# Patient Record
Sex: FEMALE | Race: ASIAN | NOT HISPANIC OR LATINO | URBAN - METROPOLITAN AREA
[De-identification: names, ages, dates, MRNs, and addresses within clinical notes are randomized per-mention and may not be internally consistent; named-entity substitution may affect disease eponyms.]

---

## 2024-09-23 PROBLEM — R91.8 LUNG NODULE, MULTIPLE: Status: ACTIVE | Noted: 2024-09-23

## 2024-09-25 ENCOUNTER — OUTPATIENT (OUTPATIENT)
Dept: OUTPATIENT SERVICES | Facility: HOSPITAL | Age: 64
LOS: 1 days | End: 2024-09-25

## 2024-09-27 ENCOUNTER — OUTPATIENT (OUTPATIENT)
Dept: OUTPATIENT SERVICES | Facility: HOSPITAL | Age: 64
LOS: 1 days | End: 2024-09-27
Payer: MEDICARE

## 2024-09-27 DIAGNOSIS — R91.8 OTHER NONSPECIFIC ABNORMAL FINDING OF LUNG FIELD: ICD-10-CM

## 2024-09-27 PROBLEM — Z86.39 HISTORY OF HYPERLIPIDEMIA: Status: RESOLVED | Noted: 2024-09-27 | Resolved: 2024-09-27

## 2024-09-27 PROBLEM — Z86.39 HISTORY OF GOITER: Status: RESOLVED | Noted: 2024-09-27 | Resolved: 2024-09-27

## 2024-09-27 LAB
ALBUMIN SERPL ELPH-MCNC: 4.3 G/DL — SIGNIFICANT CHANGE UP (ref 3.3–5)
ALP SERPL-CCNC: 91 U/L — SIGNIFICANT CHANGE UP (ref 40–120)
ALT FLD-CCNC: 13 U/L — SIGNIFICANT CHANGE UP (ref 10–45)
ANION GAP SERPL CALC-SCNC: 10 MMOL/L — SIGNIFICANT CHANGE UP (ref 5–17)
APPEARANCE UR: ABNORMAL
APTT BLD: 36.5 SEC — HIGH (ref 24.5–35.6)
AST SERPL-CCNC: 19 U/L — SIGNIFICANT CHANGE UP (ref 10–40)
BACTERIA # UR AUTO: NEGATIVE /HPF — SIGNIFICANT CHANGE UP
BASOPHILS # BLD AUTO: 0.03 K/UL — SIGNIFICANT CHANGE UP (ref 0–0.2)
BASOPHILS NFR BLD AUTO: 0.5 % — SIGNIFICANT CHANGE UP (ref 0–2)
BILIRUB SERPL-MCNC: 0.6 MG/DL — SIGNIFICANT CHANGE UP (ref 0.2–1.2)
BILIRUB UR-MCNC: NEGATIVE — SIGNIFICANT CHANGE UP
BUN SERPL-MCNC: 18 MG/DL — SIGNIFICANT CHANGE UP (ref 7–23)
CALCIUM SERPL-MCNC: 8.6 MG/DL — SIGNIFICANT CHANGE UP (ref 8.4–10.5)
CHLORIDE SERPL-SCNC: 104 MMOL/L — SIGNIFICANT CHANGE UP (ref 96–108)
CO2 SERPL-SCNC: 28 MMOL/L — SIGNIFICANT CHANGE UP (ref 22–31)
COLOR SPEC: YELLOW — SIGNIFICANT CHANGE UP
CREAT SERPL-MCNC: 0.75 MG/DL — SIGNIFICANT CHANGE UP (ref 0.5–1.3)
DIFF PNL FLD: NEGATIVE — SIGNIFICANT CHANGE UP
EGFR: 88 ML/MIN/1.73M2 — SIGNIFICANT CHANGE UP
EOSINOPHIL # BLD AUTO: 0.06 K/UL — SIGNIFICANT CHANGE UP (ref 0–0.5)
EOSINOPHIL NFR BLD AUTO: 1.1 % — SIGNIFICANT CHANGE UP (ref 0–6)
GLUCOSE SERPL-MCNC: 91 MG/DL — SIGNIFICANT CHANGE UP (ref 70–99)
GLUCOSE UR QL: NEGATIVE MG/DL — SIGNIFICANT CHANGE UP
HCT VFR BLD CALC: 41.6 % — SIGNIFICANT CHANGE UP (ref 34.5–45)
HGB BLD-MCNC: 13.3 G/DL — SIGNIFICANT CHANGE UP (ref 11.5–15.5)
IMM GRANULOCYTES NFR BLD AUTO: 0.2 % — SIGNIFICANT CHANGE UP (ref 0–0.9)
INR BLD: 1.01 — SIGNIFICANT CHANGE UP (ref 0.85–1.16)
KETONES UR-MCNC: NEGATIVE MG/DL — SIGNIFICANT CHANGE UP
LEUKOCYTE ESTERASE UR-ACNC: ABNORMAL
LYMPHOCYTES # BLD AUTO: 1.88 K/UL — SIGNIFICANT CHANGE UP (ref 1–3.3)
LYMPHOCYTES # BLD AUTO: 33.9 % — SIGNIFICANT CHANGE UP (ref 13–44)
MCHC RBC-ENTMCNC: 29.1 PG — SIGNIFICANT CHANGE UP (ref 27–34)
MCHC RBC-ENTMCNC: 32 GM/DL — SIGNIFICANT CHANGE UP (ref 32–36)
MCV RBC AUTO: 91 FL — SIGNIFICANT CHANGE UP (ref 80–100)
MONOCYTES # BLD AUTO: 0.27 K/UL — SIGNIFICANT CHANGE UP (ref 0–0.9)
MONOCYTES NFR BLD AUTO: 4.9 % — SIGNIFICANT CHANGE UP (ref 2–14)
NEUTROPHILS # BLD AUTO: 3.29 K/UL — SIGNIFICANT CHANGE UP (ref 1.8–7.4)
NEUTROPHILS NFR BLD AUTO: 59.4 % — SIGNIFICANT CHANGE UP (ref 43–77)
NITRITE UR-MCNC: NEGATIVE — SIGNIFICANT CHANGE UP
NRBC # BLD: 0 /100 WBCS — SIGNIFICANT CHANGE UP (ref 0–0)
PH UR: 6 — SIGNIFICANT CHANGE UP (ref 5–8)
PLATELET # BLD AUTO: 188 K/UL — SIGNIFICANT CHANGE UP (ref 150–400)
POTASSIUM SERPL-MCNC: 4.6 MMOL/L — SIGNIFICANT CHANGE UP (ref 3.5–5.3)
POTASSIUM SERPL-SCNC: 4.6 MMOL/L — SIGNIFICANT CHANGE UP (ref 3.5–5.3)
PROT SERPL-MCNC: 7 G/DL — SIGNIFICANT CHANGE UP (ref 6–8.3)
PROT UR-MCNC: NEGATIVE MG/DL — SIGNIFICANT CHANGE UP
PROTHROM AB SERPL-ACNC: 11.6 SEC — SIGNIFICANT CHANGE UP (ref 9.9–13.4)
RBC # BLD: 4.57 M/UL — SIGNIFICANT CHANGE UP (ref 3.8–5.2)
RBC # FLD: 11.9 % — SIGNIFICANT CHANGE UP (ref 10.3–14.5)
RBC CASTS # UR COMP ASSIST: 1 /HPF — SIGNIFICANT CHANGE UP (ref 0–4)
SODIUM SERPL-SCNC: 142 MMOL/L — SIGNIFICANT CHANGE UP (ref 135–145)
SP GR SPEC: 1.01 — SIGNIFICANT CHANGE UP (ref 1–1.03)
SQUAMOUS # UR AUTO: 0 /HPF — SIGNIFICANT CHANGE UP (ref 0–5)
UROBILINOGEN FLD QL: 0.2 MG/DL — SIGNIFICANT CHANGE UP (ref 0.2–1)
WBC # BLD: 5.54 K/UL — SIGNIFICANT CHANGE UP (ref 3.8–10.5)
WBC # FLD AUTO: 5.54 K/UL — SIGNIFICANT CHANGE UP (ref 3.8–10.5)
WBC UR QL: 3 /HPF — SIGNIFICANT CHANGE UP (ref 0–5)

## 2024-09-27 PROCEDURE — 94729 DIFFUSING CAPACITY: CPT | Mod: 26

## 2024-09-27 PROCEDURE — 94060 EVALUATION OF WHEEZING: CPT

## 2024-09-27 PROCEDURE — 94760 N-INVAS EAR/PLS OXIMETRY 1: CPT

## 2024-09-27 PROCEDURE — 94726 PLETHYSMOGRAPHY LUNG VOLUMES: CPT | Mod: 26

## 2024-09-27 PROCEDURE — 81001 URINALYSIS AUTO W/SCOPE: CPT

## 2024-09-27 PROCEDURE — 94726 PLETHYSMOGRAPHY LUNG VOLUMES: CPT

## 2024-09-27 PROCEDURE — 85730 THROMBOPLASTIN TIME PARTIAL: CPT

## 2024-09-27 PROCEDURE — 86900 BLOOD TYPING SEROLOGIC ABO: CPT

## 2024-09-27 PROCEDURE — 94010 BREATHING CAPACITY TEST: CPT | Mod: 26

## 2024-09-27 PROCEDURE — 85025 COMPLETE CBC W/AUTO DIFF WBC: CPT

## 2024-09-27 PROCEDURE — 94729 DIFFUSING CAPACITY: CPT

## 2024-09-27 PROCEDURE — 86901 BLOOD TYPING SEROLOGIC RH(D): CPT

## 2024-09-27 PROCEDURE — 80053 COMPREHEN METABOLIC PANEL: CPT

## 2024-09-27 PROCEDURE — 86850 RBC ANTIBODY SCREEN: CPT

## 2024-09-27 PROCEDURE — 85610 PROTHROMBIN TIME: CPT

## 2024-09-30 PROBLEM — Z00.00 ENCOUNTER FOR PREVENTIVE HEALTH EXAMINATION: Status: ACTIVE | Noted: 2024-09-30

## 2024-10-01 NOTE — H&P ADULT - NSHPREVIEWOFSYSTEMS_GEN_ALL_CORE
Constitutional: as noted in HPI and recent weight loss.    Constitutional, Eyes, ENT, Cardiovascular, Respiratory, Gastrointestinal, Genitourinary, Musculoskeletal, Integumentary, Neurological, Psychiatric, Endocrine and Heme/Lymph are otherwise negative.

## 2024-10-01 NOTE — H&P ADULT - HISTORY OF PRESENT ILLNESS
Ms. MARI JUAREZ, 63 year old female, never smoker, w/ hx of HLD, goiter s/p thyroidectomy, who presented with incidental finding of lung nodule, referred by Dr. Ryne Stover (PCP). Patient is here today for CT Sx consultation. Pt admits to weight loss, denies fever, chill, SOB, cough, hemoptysis, or CP.

## 2024-10-01 NOTE — H&P ADULT - NSHPLABSRESULTS_GEN_ALL_CORE
CT Chest on 9/19/24 at [facility]:  - Solid, spiculated, irregular peripheral right lower lobe pulmonary nodule measuring 2.9 x 2.0 cm (with extension to the pleural surface) on image 189, series 3 highly suspicious for primary bronchogenic neoplasm until proven otherwise.  - Mild focal bronchiectasis and tree-in-bud nodularity posterior/inferior right lower lobe on images 115-143, series 3 suggesting small airways inflammatory disease/bronchiolitis.  - Additional tree-in-bud nodularity anterior right lower lobe on images 180-196, series 3. Small 5 mm pulmonary nodule on image 21, series 3 may reflect bronchial mucoid impaction versus pulmonary nodule.  ?  Brain MRI on 9/19/24 at [facility]:  - Unremarkable non-contrast MRI of the brain.  ?  PET/CT on 9/24/24:  - an FDG avid lobulated nodule in the peripheral RLL 2.6 cm SUV=4.9  - additional nonavid 5 mm nodule in RLL  - stable nonavid focal areas of tree-in-bud opacity in the LLL

## 2024-10-01 NOTE — H&P ADULT - NSICDXPASTSURGICALHX_GEN_ALL_CORE_FT
PAST SURGICAL HISTORY:  H/O thyroidectomy     History of open reduction and internal fixation (ORIF) procedure

## 2024-10-01 NOTE — H&P ADULT - NSHPPHYSICALEXAM_GEN_ALL_CORE
weight = 134 lbs    ECOG Performance Status: Status 1- Restricted in physically strenuous activity but ambulatory and able to carry out work of a light or sedentary nature, e.g., light house work, office work.       Constitutional: alert and in no acute distress.    Eyes: the sclera and conjunctiva were normal, pupils were equal in size, round, and reactive to light and extraocular movements were intact.    ENT: the ears and nose were normal in appearance . the oropharynx was normal.    Neck: the appearance of the neck was normal, no neck mass was observed, the thyroid was not enlarged and there were no palpable thyroid nodules . there was no jugular-venous distention.    Pulmonary: no respiratory distress and lungs were clear to auscultation bilaterally.    Heart: heart rate was normal and rhythm regular, normal S1 and S2, no gallops, no murmurs and no pericardial rub.    Chest: the chest was normal in appearance, no chest asymmetry and normal chest expansion.    Vascular: Carotid: right 2+ and left 2+. Brachial: right 2+ and left 2+. Radial: right 2+ and left 2+.    Breasts: normal in appearance and no palpable masses.    Abdomen: normal bowel sounds, soft, non-tender, no hepato-splenomegaly and no abdominal mass palpated.    Genitourinary:. deferred.    Lymphatics: The posterior cervical, anterior cervical and supraclavicular nodes were non-tender and normal size.    Back: no costovertebral angle tenderness and no spinal tenderness.    Musculoskeletal: normal gait, no clubbing or cyanosis of the fingernails, no joint swelling seen and muscle strength and tone were normal.    Skin: normal skin color and pigmentation, normal skin turgor and no rash.    Neurological: deep tendon reflexes were 2+ and symmetric, the sensory exam was normal to light touch and pinprick and no focal deficits.    Psychiatric: oriented to person, place, and time, insight and judgment were intact and the affect was normal.

## 2024-10-01 NOTE — H&P ADULT - ASSESSMENT
Ms. MARI JUAREZ, 63 year old female, never smoker, w/ hx of HLD, goiter s/p thyroidectomy, who presented with incidental finding of lung nodule, referred by Dr. Ryne Stover (PCP)  ?  CT Chest on 9/19/24 at [facility]:  - Solid, spiculated, irregular peripheral right lower lobe pulmonary nodule measuring 2.9 x 2.0 cm (with extension to the pleural surface) on image 189, series 3 highly suspicious for primary bronchogenic neoplasm until proven otherwise.  - Mild focal bronchiectasis and tree-in-bud nodularity posterior/inferior right lower lobe on images 115-143, series 3 suggesting small airways inflammatory disease/bronchiolitis.  - Additional tree-in-bud nodularity anterior right lower lobe on images 180-196, series 3. Small 5 mm pulmonary nodule on image 21, series 3 may reflect bronchial mucoid impaction versus pulmonary nodule.  ?  Brain MRI on 9/19/24 at [facility] [place]:  - Unremarkable non-contrast MRI of the brain.  ?  PET/CT on 9/24/24:  - an FDG avid lobulated nodule in the peripheral RLL 2.6 cm SUV=4.9  - additional nonavid 5 mm nodule in RLL  - stable nonavid focal areas of tree-in-bud opacity in the LLL  ?  I have reviewed the patient's medical records and diagnostic images at time of this office consultation and have made the following recommendation:  1. PET reviewed with pt, RLL nodule is highly suspicious for malignancy. Therefore, I recommended Rt VATS, R.A., RLLobectomy, MLND on 10/4/24 at [facility]. Risks of surgery includes but not limited to pain, infection, bleeding, injuries to surrounding structures, and need for further procedure, stroke or death. Benefits and alternatives explained to patient, all questions answered, patient agreed to proceed with surgery.

## 2024-10-03 NOTE — PATIENT PROFILE ADULT - FALL HARM RISK - UNIVERSAL INTERVENTIONS
Bed in lowest position, wheels locked, appropriate side rails in place/Call bell, personal items and telephone in reach/Instruct patient to call for assistance before getting out of bed or chair/Non-slip footwear when patient is out of bed/[place] to call system/Physically safe environment - no spills, clutter or unnecessary equipment/Purposeful Proactive Rounding/Room/bathroom lighting operational, light cord in reach

## 2024-10-03 NOTE — PATIENT PROFILE ADULT - STATED REASON FOR ADMISSION
Right video assisted thoracic surgery, Robotic assisted right lower lobectomy and mediastinal lymph node dissection

## 2024-10-04 ENCOUNTER — INPATIENT (INPATIENT)
Facility: HOSPITAL | Age: 64
LOS: 2 days | Discharge: HOME | End: 2024-10-07
Attending: THORACIC SURGERY (CARDIOTHORACIC VASCULAR SURGERY) | Admitting: THORACIC SURGERY (CARDIOTHORACIC VASCULAR SURGERY)
Payer: MEDICARE

## 2024-10-04 VITALS
RESPIRATION RATE: 16 BRPM | HEART RATE: 63 BPM | HEIGHT: 64 IN | SYSTOLIC BLOOD PRESSURE: 137 MMHG | WEIGHT: 135.14 LBS | TEMPERATURE: 98 F | OXYGEN SATURATION: 98 % | DIASTOLIC BLOOD PRESSURE: 77 MMHG

## 2024-10-04 DIAGNOSIS — Z98.890 OTHER SPECIFIED POSTPROCEDURAL STATES: Chronic | ICD-10-CM

## 2024-10-04 DIAGNOSIS — J98.4 OTHER DISORDERS OF LUNG: ICD-10-CM

## 2024-10-04 DIAGNOSIS — R91.1 SOLITARY PULMONARY NODULE: ICD-10-CM

## 2024-10-04 DIAGNOSIS — Z79.890 HORMONE REPLACEMENT THERAPY: ICD-10-CM

## 2024-10-04 DIAGNOSIS — J95.812 POSTPROCEDURAL AIR LEAK: ICD-10-CM

## 2024-10-04 DIAGNOSIS — E89.0 POSTPROCEDURAL HYPOTHYROIDISM: ICD-10-CM

## 2024-10-04 DIAGNOSIS — E89.0 POSTPROCEDURAL HYPOTHYROIDISM: Chronic | ICD-10-CM

## 2024-10-04 LAB
ANION GAP SERPL CALC-SCNC: 12 MMOL/L — SIGNIFICANT CHANGE UP (ref 5–17)
APTT BLD: 32.2 SEC — SIGNIFICANT CHANGE UP (ref 24.5–35.6)
BASOPHILS # BLD AUTO: 0.02 K/UL — SIGNIFICANT CHANGE UP (ref 0–0.2)
BASOPHILS NFR BLD AUTO: 0.2 % — SIGNIFICANT CHANGE UP (ref 0–2)
BLD GP AB SCN SERPL QL: NEGATIVE — SIGNIFICANT CHANGE UP
BUN SERPL-MCNC: 16 MG/DL — SIGNIFICANT CHANGE UP (ref 7–23)
CALCIUM SERPL-MCNC: 8.3 MG/DL — LOW (ref 8.4–10.5)
CHLORIDE SERPL-SCNC: 104 MMOL/L — SIGNIFICANT CHANGE UP (ref 96–108)
CO2 SERPL-SCNC: 24 MMOL/L — SIGNIFICANT CHANGE UP (ref 22–31)
CREAT SERPL-MCNC: 0.68 MG/DL — SIGNIFICANT CHANGE UP (ref 0.5–1.3)
EGFR: 96 ML/MIN/1.73M2 — SIGNIFICANT CHANGE UP
EOSINOPHIL # BLD AUTO: 0.01 K/UL — SIGNIFICANT CHANGE UP (ref 0–0.5)
EOSINOPHIL NFR BLD AUTO: 0.1 % — SIGNIFICANT CHANGE UP (ref 0–6)
GLUCOSE BLDC GLUCOMTR-MCNC: 161 MG/DL — HIGH (ref 70–99)
GLUCOSE SERPL-MCNC: 160 MG/DL — HIGH (ref 70–99)
HCT VFR BLD CALC: 38.6 % — SIGNIFICANT CHANGE UP (ref 34.5–45)
HGB BLD-MCNC: 12.6 G/DL — SIGNIFICANT CHANGE UP (ref 11.5–15.5)
IMM GRANULOCYTES NFR BLD AUTO: 0.5 % — SIGNIFICANT CHANGE UP (ref 0–0.9)
INR BLD: 0.99 — SIGNIFICANT CHANGE UP (ref 0.85–1.16)
LYMPHOCYTES # BLD AUTO: 1.16 K/UL — SIGNIFICANT CHANGE UP (ref 1–3.3)
LYMPHOCYTES # BLD AUTO: 10.5 % — LOW (ref 13–44)
MCHC RBC-ENTMCNC: 29.2 PG — SIGNIFICANT CHANGE UP (ref 27–34)
MCHC RBC-ENTMCNC: 32.6 GM/DL — SIGNIFICANT CHANGE UP (ref 32–36)
MCV RBC AUTO: 89.4 FL — SIGNIFICANT CHANGE UP (ref 80–100)
MONOCYTES # BLD AUTO: 0.24 K/UL — SIGNIFICANT CHANGE UP (ref 0–0.9)
MONOCYTES NFR BLD AUTO: 2.2 % — SIGNIFICANT CHANGE UP (ref 2–14)
NEUTROPHILS # BLD AUTO: 9.54 K/UL — HIGH (ref 1.8–7.4)
NEUTROPHILS NFR BLD AUTO: 86.5 % — HIGH (ref 43–77)
NRBC # BLD: 0 /100 WBCS — SIGNIFICANT CHANGE UP (ref 0–0)
PLATELET # BLD AUTO: 155 K/UL — SIGNIFICANT CHANGE UP (ref 150–400)
POTASSIUM SERPL-MCNC: 3.6 MMOL/L — SIGNIFICANT CHANGE UP (ref 3.5–5.3)
POTASSIUM SERPL-SCNC: 3.6 MMOL/L — SIGNIFICANT CHANGE UP (ref 3.5–5.3)
PROTHROM AB SERPL-ACNC: 11.6 SEC — SIGNIFICANT CHANGE UP (ref 9.9–13.4)
RBC # BLD: 4.32 M/UL — SIGNIFICANT CHANGE UP (ref 3.8–5.2)
RBC # FLD: 11.8 % — SIGNIFICANT CHANGE UP (ref 10.3–14.5)
RH IG SCN BLD-IMP: POSITIVE — SIGNIFICANT CHANGE UP
SODIUM SERPL-SCNC: 140 MMOL/L — SIGNIFICANT CHANGE UP (ref 135–145)
WBC # BLD: 11.03 K/UL — HIGH (ref 3.8–10.5)
WBC # FLD AUTO: 11.03 K/UL — HIGH (ref 3.8–10.5)

## 2024-10-04 PROCEDURE — 88309 TISSUE EXAM BY PATHOLOGIST: CPT | Mod: 26

## 2024-10-04 PROCEDURE — 32674 THORACOSCOPY LYMPH NODE EXC: CPT | Mod: 80

## 2024-10-04 PROCEDURE — 32663 THORACOSCOPY W/LOBECTOMY: CPT | Mod: RT

## 2024-10-04 PROCEDURE — 32663 THORACOSCOPY W/LOBECTOMY: CPT | Mod: 80,RT

## 2024-10-04 PROCEDURE — 32652 THORACOSCOPY REM TOTL CORTEX: CPT | Mod: RT

## 2024-10-04 PROCEDURE — 88305 TISSUE EXAM BY PATHOLOGIST: CPT | Mod: 26

## 2024-10-04 PROCEDURE — 71045 X-RAY EXAM CHEST 1 VIEW: CPT | Mod: 26

## 2024-10-04 PROCEDURE — 32652 THORACOSCOPY REM TOTL CORTEX: CPT | Mod: 80,RT

## 2024-10-04 PROCEDURE — 32674 THORACOSCOPY LYMPH NODE EXC: CPT

## 2024-10-04 PROCEDURE — S2900 ROBOTIC SURGICAL SYSTEM: CPT | Mod: NC

## 2024-10-04 DEVICE — LIGATING CLIPS WECK HEMOLOK POLYMER LARGE (PURPLE) 6: Type: IMPLANTABLE DEVICE | Site: RIGHT | Status: FUNCTIONAL

## 2024-10-04 DEVICE — CHEST DRAIN THORACIC PVC 28FR STRAIGHT: Type: IMPLANTABLE DEVICE | Site: RIGHT | Status: FUNCTIONAL

## 2024-10-04 DEVICE — STAPLER COVIDIEN TRI-STAPLE CURVED 45MM TAN RELOAD: Type: IMPLANTABLE DEVICE | Site: RIGHT | Status: FUNCTIONAL

## 2024-10-04 DEVICE — STAPLER COVIDIEN TRI-STAPLE CURVED 60MM PURPLE RELOAD: Type: IMPLANTABLE DEVICE | Site: RIGHT | Status: FUNCTIONAL

## 2024-10-04 DEVICE — STAPLER COVIDIEN TRI-STAPLE 60MM PURPLE RELOAD: Type: IMPLANTABLE DEVICE | Site: RIGHT | Status: FUNCTIONAL

## 2024-10-04 DEVICE — STAPLER COVIDIEN TRI-STAPLE 45MM PURPLE RELOAD: Type: IMPLANTABLE DEVICE | Site: RIGHT | Status: FUNCTIONAL

## 2024-10-04 DEVICE — STAPLER COVIDIEN TRI-STAPLE 60MM TAN RELOAD: Type: IMPLANTABLE DEVICE | Site: RIGHT | Status: FUNCTIONAL

## 2024-10-04 DEVICE — STAPLER COVIDIEN TRI-STAPLE CURVED 45MM PURPLE RELOAD: Type: IMPLANTABLE DEVICE | Site: RIGHT | Status: FUNCTIONAL

## 2024-10-04 RX ORDER — ACETAMINOPHEN 325 MG
975 TABLET ORAL ONCE
Refills: 0 | Status: COMPLETED | OUTPATIENT
Start: 2024-10-04 | End: 2024-10-04

## 2024-10-04 RX ORDER — TIOTROPIUM BROMIDE INHALATION SPRAY 3.12 UG/1
2 SPRAY, METERED RESPIRATORY (INHALATION) DAILY
Refills: 0 | Status: DISCONTINUED | OUTPATIENT
Start: 2024-10-04 | End: 2024-10-04

## 2024-10-04 RX ORDER — SODIUM CHLORIDE IRRIG SOLUTION 0.9 %
1000 SOLUTION, IRRIGATION IRRIGATION
Refills: 0 | Status: DISCONTINUED | OUTPATIENT
Start: 2024-10-04 | End: 2024-10-04

## 2024-10-04 RX ORDER — METOPROLOL TARTRATE 50 MG
12.5 TABLET ORAL EVERY 12 HOURS
Refills: 0 | Status: DISCONTINUED | OUTPATIENT
Start: 2024-10-05 | End: 2024-10-07

## 2024-10-04 RX ORDER — FAMOTIDINE 40 MG
20 TABLET ORAL DAILY
Refills: 0 | Status: DISCONTINUED | OUTPATIENT
Start: 2024-10-04 | End: 2024-10-04

## 2024-10-04 RX ORDER — LIDOCAINE 50 MG/G
1 CREAM TOPICAL DAILY
Refills: 0 | Status: DISCONTINUED | OUTPATIENT
Start: 2024-10-04 | End: 2024-10-07

## 2024-10-04 RX ORDER — ACETAMINOPHEN 325 MG
650 TABLET ORAL EVERY 6 HOURS
Refills: 0 | Status: DISCONTINUED | OUTPATIENT
Start: 2024-10-05 | End: 2024-10-05

## 2024-10-04 RX ORDER — OXYCODONE HYDROCHLORIDE 30 MG/1
5 TABLET, FILM COATED, EXTENDED RELEASE ORAL EVERY 6 HOURS
Refills: 0 | Status: DISCONTINUED | OUTPATIENT
Start: 2024-10-04 | End: 2024-10-05

## 2024-10-04 RX ORDER — SENNOSIDES 8.6 MG
1 TABLET ORAL AT BEDTIME
Refills: 0 | Status: DISCONTINUED | OUTPATIENT
Start: 2024-10-04 | End: 2024-10-07

## 2024-10-04 RX ORDER — ACETAMINOPHEN 325 MG
1000 TABLET ORAL ONCE
Refills: 0 | Status: DISCONTINUED | OUTPATIENT
Start: 2024-10-04 | End: 2024-10-05

## 2024-10-04 RX ORDER — CEFAZOLIN SODIUM 1 G
2000 VIAL (EA) INJECTION EVERY 8 HOURS
Refills: 0 | Status: COMPLETED | OUTPATIENT
Start: 2024-10-04 | End: 2024-10-05

## 2024-10-04 RX ORDER — PANTOPRAZOLE SODIUM 40 MG/1
40 TABLET, DELAYED RELEASE ORAL
Refills: 0 | Status: DISCONTINUED | OUTPATIENT
Start: 2024-10-04 | End: 2024-10-07

## 2024-10-04 RX ADMIN — Medication 975 MILLIGRAM(S): at 08:43

## 2024-10-04 RX ADMIN — Medication 20 MILLIEQUIVALENT(S): at 20:06

## 2024-10-04 RX ADMIN — Medication 5000 UNIT(S): at 08:43

## 2024-10-04 RX ADMIN — Medication 100 MILLIGRAM(S): at 22:38

## 2024-10-04 RX ADMIN — Medication 5000 UNIT(S): at 22:39

## 2024-10-04 NOTE — BRIEF OPERATIVE NOTE - NSICDXBRIEFPROCEDURE_GEN_ALL_CORE_FT
PROCEDURES:  Robot-assisted thoracoscopic lobectomy of lung using da Jose Xi 04-Oct-2024 15:27:08 Right lower lobectomy, mediastinal lymph node dissections. Lysis of adhesions Kiki Bender

## 2024-10-04 NOTE — PROGRESS NOTE ADULT - ASSESSMENT
Ms. MARI JUAREZ, 63 year old female, never smoker, w/ hx of HLD, goiter s/p thyroidectomy, who presented with incidental finding of lung nodule, referred by Dr. Ryne Stover (PCP). 10/4 patient underwent a RVATS, RA, RLL lobectomy, MLND. Patient tranfered to PACU post-op. CT on suction with intermittent air leak, with 85 mL output in 45 mins.       Neuro:   No delirium and focal deficits.   Pain: Prn oxy. Standing tylenol and lidocaine patch.      Cardio:  Afib ppx: Started on 12.5 mg BB.  Vitals over the last 24 hrs:    HR: 63    BP: 121/67     Pulm:   POD 0 s/p R VATS with RL lobectomy and MLND.    - Lysis of adhesions during surgery, hold NSAID for bleeding risk.   -  IS encouraged. Sating at ***  CXR:    GI:   Tolerating diet well   Prophylaxis: 40 mg pantoprazole. ***  Bowel: Senna and PEG. ***     ID:   WBC ***. Afebrile/Febrile.  Monitor fever curve     Renal:   BUN/Creat @ **  I/O net:   Electrolytes: Replete electrolytes prn.     Hem:   H&H @   DVT prophylaxis: SubQ Heparin ***    Endo:   A1C:  TSH:    MSK:   Ambulating well. Continue with PT/OT. ***    Disposition:   Continue with inpatient care. ***   Ms. MARI JUAREZ, 63 year old female, never smoker, w/ hx of HLD, goiter s/p thyroidectomy, who presented with incidental finding of lung nodule, referred by Dr. Ryne Stover (PCP). 10/4 patient underwent a RVATS, RA, RLL lobectomy, MLND and lysis of adhesions. Patient tranfered to PACU post-op. CT on suction with intermittent air leak, with 85 mL output in 45 mins.       Neuro:   No delirium and focal deficits.   Pain: Prn oxy. Standing tylenol and lidocaine patch.      Cardio:  Afib ppx: Started on 12.5 mg BB.  Vitals over the last 24 hrs:    HR: 63    BP: 121/67     Pulm:   POD 0 s/p R VATS with RL lobectomy and MLND.    - Lysis of adhesions during surgery, hold NSAID for bleeding risk.   - Started atrovent.   IS encouraged. Sating at 100 on 4L NC  CXR: No pneumo or effusion. Sentinal hole possibly outside of rib space, tube was advanced further at bed side. Re-evaluate tomorrow with CXR.    GI:   Clear diet started, advanced as appropriate   Prophylaxis: 40 mg pantoprazole.   Bowel: Senna and PEG.      ID:   WBC 11.03. Afebrile. Most likely stress induced, continue to monitor.  Monitor fever curve     Renal:   BUN/Creat @ 16/0.68  I/O net: 85 ml output chest tube 45 mins postop.  Electrolytes: Replete electrolytes prn.     Hem:   H&H @ 12.6/38.6.  DVT prophylaxis: SubQ Heparin     Endo:   A1C: Pending  Hypothyroidism    -TSH: Pending    - Continue with levothyroxine.    MSK:   Ambulating well. Continue with PT/OT.     Disposition:   Continue with inpatient care.    Ms. MARI JUAREZ, 63 year old female, never smoker, w/ hx of HLD, goiter s/p thyroidectomy, who presented with incidental finding of lung nodule, referred by Dr. Ryne Stover (PCP). 10/4 patient underwent a RVATS, RA, RLL lobectomy, MLND and lysis of adhesions. Patient transferred to PACU post-op. CT on suction with intermittent air leak, with 85 mL output in 45 mins.       Neuro:   No delirium and focal deficits.   Pain: Prn oxy. Standing tylenol and lidocaine patch.      Cardio:  Afib ppx: Started on 12.5 mg BB.  Vitals over the last 24 hrs:    HR: 63    BP: 121/67     Pulm:   POD 0 s/p R VATS with RL lobectomy and MLND.    - Lysis of adhesions during surgery, hold NSAID for bleeding risk.   - Started atrovent.   IS encouraged. Sating at 100 on 4L NC  CXR: No pneumo or effusion. Sentinal hole possibly outside of rib space, tube was advanced further at bed side. Re-evaluate tomorrow with CXR.    GI:   Clear diet started, advanced as appropriate   Prophylaxis: 40 mg pantoprazole.   Bowel: Senna and PEG.      ID:   WBC 11.03. Afebrile. Most likely stress induced, continue to monitor.  Monitor fever curve     Renal:   BUN/Creat @ 16/0.68  I/O net: 85 ml output chest tube 45 mins postop.  Electrolytes: Replete electrolytes prn.     Hem:   H&H @ 12.6/38.6.  DVT prophylaxis: SubQ Heparin     Endo:   A1C: Pending  Hypothyroidism    -TSH: Pending    - Continue with levothyroxine.    MSK:   Ambulating well. Continue with PT/OT.     Disposition:   Continue with inpatient care.

## 2024-10-04 NOTE — PROGRESS NOTE ADULT - SUBJECTIVE AND OBJECTIVE BOX
Patient discussed on morning rounds with Dr. Brown    OPERATION & DATE: 10/4 s/p RVATS, RA, RLL lobectomy, MLND      SUBJECTIVE ASSESSMENT:     64 yo female assessed in the PACU after surgery. Patient reports to be doing well, pain is well controlled from intra-op neural blocks. Denies SOB, chest pain, headaches, weakness, or numbness.     VITAL SIGNS:  Vital Signs Last 24 Hrs  T(C): 36.6 (04 Oct 2024 15:14), Max: 36.6 (04 Oct 2024 15:14)  T(F): 97.8 (04 Oct 2024 15:14), Max: 97.8 (04 Oct 2024 15:14)  HR: 63 (04 Oct 2024 16:14) (54 - 63)  BP: 121/67 (04 Oct 2024 16:14) (121/67 - 138/70)  BP(mean): 88 (04 Oct 2024 16:14) (86 - 96)  RR: 14 (04 Oct 2024 16:14) (12 - 22)  SpO2: 100% (04 Oct 2024 16:14) (98% - 100%)    Parameters below as of 04 Oct 2024 15:29  Patient On (Oxygen Delivery Method): nasal cannula  O2 Flow (L/min): 4    I&O's Detail    04 Oct 2024 07:01  -  04 Oct 2024 17:15  --------------------------------------------------------  IN:    Lactated Ringers: 60 mL    Lactated Ringers Bolus: 1500 mL  Total IN: 1560 mL    OUT:    Blood Loss (mL): 50 mL    Chest Tube (mL): 85 mL  Total OUT: 135 mL    Total NET: 1425 mL        CHEST TUBE: 1x CT on suction, intermittent air leak.   YANE DRAIN: No  STITCHES: Yes    PHYSICAL EXAM:  General: Sitting in bed comfortably in NAD  Neuro: A&Ox3, no focal deficits   HEENT: NCAT, EOMI   Cardiac: Regular rate and rhythm, normal S1 and S2. No m/r/g   Pulm: Breathing comfortably on 4 L NC. No signs of respiratory distress. Lungs are CTA b/l without wheezes, rales, or rhonchi   Abdomen: Soft, non-distended, non-tender. + bowel sounds   Extremities: Warm and well perfused, no peripheral edema.   MSK: Full AROM   Wound: Chest tube site covered with gauze and tape, not saturated. Port site with steri-strips in place.       LABS:                        12.6   11.03 )-----------( 155      ( 04 Oct 2024 15:31 )             38.6     PT/INR - ( 04 Oct 2024 15:31 )   PT: 11.6 sec;   INR: 0.99          PTT - ( 04 Oct 2024 15:31 )  PTT:32.2 sec  10-04    140  |  104  |  16  ----------------------------<  160[H]  3.6   |  24  |  0.68    Ca    8.3[L]      04 Oct 2024 15:31      Urinalysis Basic - ( 04 Oct 2024 15:31 )    Color: x / Appearance: x / SG: x / pH: x  Gluc: 160 mg/dL / Ketone: x  / Bili: x / Urobili: x   Blood: x / Protein: x / Nitrite: x   Leuk Esterase: x / RBC: x / WBC x   Sq Epi: x / Non Sq Epi: x / Bacteria: x      MEDICATIONS  (STANDING):  ceFAZolin   IVPB 2000 milliGRAM(s) IV Intermittent every 8 hours  heparin   Injectable 5000 Unit(s) SubCutaneous every 8 hours  lidocaine   4% Patch 1 Patch Transdermal daily  pantoprazole    Tablet 40 milliGRAM(s) Oral before breakfast  polyethylene glycol 3350 17 Gram(s) Oral once  potassium chloride    Tablet ER 20 milliEquivalent(s) Oral once  senna 1 Tablet(s) Oral at bedtime    MEDICATIONS  (PRN):  acetaminophen   IVPB .. 1000 milliGRAM(s) IV Intermittent once PRN Temp greater or equal to 38C (100.4F), Mild Pain (1 - 3)  oxyCODONE    IR 5 milliGRAM(s) Oral every 6 hours PRN Severe Pain (7 - 10)    RADIOLOGY & ADDITIONAL TESTS:

## 2024-10-05 LAB
A1C WITH ESTIMATED AVERAGE GLUCOSE RESULT: 5.9 % — HIGH (ref 4–5.6)
ADD ON TEST-SPECIMEN IN LAB: SIGNIFICANT CHANGE UP
ANION GAP SERPL CALC-SCNC: 11 MMOL/L — SIGNIFICANT CHANGE UP (ref 5–17)
BASOPHILS # BLD AUTO: 0.02 K/UL — SIGNIFICANT CHANGE UP (ref 0–0.2)
BASOPHILS NFR BLD AUTO: 0.2 % — SIGNIFICANT CHANGE UP (ref 0–2)
BUN SERPL-MCNC: 14 MG/DL — SIGNIFICANT CHANGE UP (ref 7–23)
CALCIUM SERPL-MCNC: 8.1 MG/DL — LOW (ref 8.4–10.5)
CHLORIDE SERPL-SCNC: 102 MMOL/L — SIGNIFICANT CHANGE UP (ref 96–108)
CO2 SERPL-SCNC: 26 MMOL/L — SIGNIFICANT CHANGE UP (ref 22–31)
CREAT SERPL-MCNC: 0.79 MG/DL — SIGNIFICANT CHANGE UP (ref 0.5–1.3)
EGFR: 82 ML/MIN/1.73M2 — SIGNIFICANT CHANGE UP
EOSINOPHIL # BLD AUTO: 0 K/UL — SIGNIFICANT CHANGE UP (ref 0–0.5)
EOSINOPHIL NFR BLD AUTO: 0 % — SIGNIFICANT CHANGE UP (ref 0–6)
ESTIMATED AVERAGE GLUCOSE: 123 MG/DL — HIGH (ref 68–114)
GLUCOSE SERPL-MCNC: 144 MG/DL — HIGH (ref 70–99)
HCT VFR BLD CALC: 40.6 % — SIGNIFICANT CHANGE UP (ref 34.5–45)
HGB BLD-MCNC: 13.1 G/DL — SIGNIFICANT CHANGE UP (ref 11.5–15.5)
IMM GRANULOCYTES NFR BLD AUTO: 0.4 % — SIGNIFICANT CHANGE UP (ref 0–0.9)
LYMPHOCYTES # BLD AUTO: 1.29 K/UL — SIGNIFICANT CHANGE UP (ref 1–3.3)
LYMPHOCYTES # BLD AUTO: 11.3 % — LOW (ref 13–44)
MCHC RBC-ENTMCNC: 29 PG — SIGNIFICANT CHANGE UP (ref 27–34)
MCHC RBC-ENTMCNC: 32.3 GM/DL — SIGNIFICANT CHANGE UP (ref 32–36)
MCV RBC AUTO: 89.8 FL — SIGNIFICANT CHANGE UP (ref 80–100)
MONOCYTES # BLD AUTO: 0.64 K/UL — SIGNIFICANT CHANGE UP (ref 0–0.9)
MONOCYTES NFR BLD AUTO: 5.6 % — SIGNIFICANT CHANGE UP (ref 2–14)
NEUTROPHILS # BLD AUTO: 9.4 K/UL — HIGH (ref 1.8–7.4)
NEUTROPHILS NFR BLD AUTO: 82.5 % — HIGH (ref 43–77)
NRBC # BLD: 0 /100 WBCS — SIGNIFICANT CHANGE UP (ref 0–0)
PLATELET # BLD AUTO: 185 K/UL — SIGNIFICANT CHANGE UP (ref 150–400)
POTASSIUM SERPL-MCNC: 4.7 MMOL/L — SIGNIFICANT CHANGE UP (ref 3.5–5.3)
POTASSIUM SERPL-SCNC: 4.7 MMOL/L — SIGNIFICANT CHANGE UP (ref 3.5–5.3)
RBC # BLD: 4.52 M/UL — SIGNIFICANT CHANGE UP (ref 3.8–5.2)
RBC # FLD: 11.9 % — SIGNIFICANT CHANGE UP (ref 10.3–14.5)
SODIUM SERPL-SCNC: 139 MMOL/L — SIGNIFICANT CHANGE UP (ref 135–145)
TSH SERPL-MCNC: 1.64 UIU/ML — SIGNIFICANT CHANGE UP (ref 0.27–4.2)
WBC # BLD: 11.39 K/UL — HIGH (ref 3.8–10.5)
WBC # FLD AUTO: 11.39 K/UL — HIGH (ref 3.8–10.5)

## 2024-10-05 PROCEDURE — 71045 X-RAY EXAM CHEST 1 VIEW: CPT | Mod: 26

## 2024-10-05 PROCEDURE — 99222 1ST HOSP IP/OBS MODERATE 55: CPT

## 2024-10-05 RX ORDER — ACETAMINOPHEN 325 MG
975 TABLET ORAL EVERY 6 HOURS
Refills: 0 | Status: DISCONTINUED | OUTPATIENT
Start: 2024-10-05 | End: 2024-10-05

## 2024-10-05 RX ORDER — ACETAMINOPHEN 325 MG
650 TABLET ORAL EVERY 6 HOURS
Refills: 0 | Status: DISCONTINUED | OUTPATIENT
Start: 2024-10-05 | End: 2024-10-07

## 2024-10-05 RX ORDER — OXYCODONE HYDROCHLORIDE 30 MG/1
5 TABLET, FILM COATED, EXTENDED RELEASE ORAL EVERY 6 HOURS
Refills: 0 | Status: DISCONTINUED | OUTPATIENT
Start: 2024-10-05 | End: 2024-10-07

## 2024-10-05 RX ORDER — OXYCODONE HYDROCHLORIDE 30 MG/1
10 TABLET, FILM COATED, EXTENDED RELEASE ORAL EVERY 6 HOURS
Refills: 0 | Status: DISCONTINUED | OUTPATIENT
Start: 2024-10-05 | End: 2024-10-07

## 2024-10-05 RX ADMIN — Medication 5000 UNIT(S): at 21:09

## 2024-10-05 RX ADMIN — Medication 5000 UNIT(S): at 06:07

## 2024-10-05 RX ADMIN — LIDOCAINE 1 PATCH: 50 CREAM TOPICAL at 13:49

## 2024-10-05 RX ADMIN — LIDOCAINE 1 PATCH: 50 CREAM TOPICAL at 19:43

## 2024-10-05 RX ADMIN — Medication 12.5 MILLIGRAM(S): at 19:34

## 2024-10-05 RX ADMIN — Medication 650 MILLIGRAM(S): at 19:42

## 2024-10-05 RX ADMIN — Medication 5000 UNIT(S): at 13:49

## 2024-10-05 RX ADMIN — Medication 12.5 MILLIGRAM(S): at 06:07

## 2024-10-05 RX ADMIN — Medication 650 MILLIGRAM(S): at 19:33

## 2024-10-05 RX ADMIN — Medication 975 MILLIGRAM(S): at 19:43

## 2024-10-05 RX ADMIN — PANTOPRAZOLE SODIUM 40 MILLIGRAM(S): 40 TABLET, DELAYED RELEASE ORAL at 06:07

## 2024-10-05 RX ADMIN — Medication 75 MICROGRAM(S): at 06:12

## 2024-10-05 RX ADMIN — Medication 100 MILLIGRAM(S): at 13:49

## 2024-10-05 RX ADMIN — Medication 975 MILLIGRAM(S): at 09:42

## 2024-10-05 RX ADMIN — Medication 17 GRAM(S): at 13:49

## 2024-10-05 RX ADMIN — Medication 100 MILLIGRAM(S): at 06:07

## 2024-10-05 NOTE — PROGRESS NOTE ADULT - ASSESSMENT
Neuro:   No delirium and focal deficits.   Pain: Prn oxy. Standing tylenol and lidocaine patch.      Cardio:  Afib ppx: Started on 12.5 mg BB.  Vitals over the last 24 hrs:    HR: 54-69    BP: 114//77     Pulm:   POD 0 s/p R VATS with RL lobectomy and MLND.    - Lysis of adhesions during surgery, hold NSAID for bleeding risk.   - Started atrovent.    - 500 output chest tube over 24 hr  IS encouraged. Sating at 95 on RA  CXR: No pneumo or effusion. CT in good position.     GI:   Tolerating diet well  Prophylaxis: 40 mg pantoprazole.   Bowel: Senna and PEG.      ID:   WBC 11.39. Afebrile. Most likely stress induced, continue to monitor.  Monitor fever curve   Continue cefazolin for post-surgical infection ppx    Renal:   BUN/Creat @ 14/0.79  I/O net: -1140  Electrolytes: Replete electrolytes prn.     Hem:   H&H @ 13.1/40.6  DVT prophylaxis: SubQ Heparin       Endo:   A1C: 5.9  Hypothyroidism    -TSH: 1.64    - Continue with levothyroxine.    MSK:   Ambulating well. Continue with PT/OT.     Disposition:   Continue with inpatient care.    Neuro:   No delirium and focal deficits.   Pain: Prn oxy. Standing tylenol and lidocaine patch.      Cardio:  Afib ppx: Started on 12.5 mg BB.  Vitals over the last 24 hrs:    HR: 54-69    BP: 114//77     Pulm:   POD 0 s/p R VATS with RL lobectomy and MLND.    - Lysis of adhesions during surgery, hold NSAID for bleeding risk.   - Started atrovent.    - 500 output chest tube over 24 hr  IS encouraged. Pulling 750 cc. Sating at 95 on RA  CXR: No pneumo or effusion. CT in good position.     GI:   Tolerating diet well  Prophylaxis: 40 mg pantoprazole.   Bowel: Senna and PEG.      ID:   WBC 11.39. Afebrile. Most likely stress induced, continue to monitor.  Monitor fever curve   Continue cefazolin for post-surgical infection ppx    Renal:   BUN/Creat @ 14/0.79  I/O net: -1140  Electrolytes: Replete electrolytes prn.     Hem:   H&H @ 13.1/40.6  DVT prophylaxis: SubQ Heparin       Endo:   A1C: 5.9  Hypothyroidism    -TSH: 1.64    - Continue with levothyroxine.    MSK:   Ambulating well. Continue with PT/OT.     Disposition:   Continue with inpatient care.    Ms. MARI JUAREZ, 63 year old female, never smoker, w/ hx of HLD, goiter s/p thyroidectomy, who presented with incidental finding of lung nodule, referred by Dr. Ryne Stover (PCP). 10/4 patient underwent a RVATS, RA, RLL lobectomy, MLND and lysis of adhesions. Patient transferred to PACU post-op. POD 1 decreased output from CT. 1X CT on suction no air leak. Pain controlled.     Neuro:   No delirium and focal deficits.   Pain: Prn oxy. Standing tylenol and lidocaine patch.      Cardio:  Afib ppx: Started on 12.5 mg BB.  Vitals over the last 24 hrs:    HR: 54-69    BP: 114//77     Pulm:   POD 0 s/p R VATS with RL lobectomy and MLND.    - Lysis of adhesions during surgery, hold NSAID for bleeding risk.   - Started atrovent.    - 500 output chest tube over 24 hr  IS encouraged. Pulling 750 cc. Sating at 95 on RA  CXR: No pneumo or effusion. CT in good position.     GI:   Tolerating diet well  Prophylaxis: 40 mg pantoprazole.   Bowel: Senna and PEG.      ID:   WBC 11.39. Afebrile. Most likely stress induced, continue to monitor.  Monitor fever curve   Continue cefazolin for post-surgical infection ppx    Renal:   BUN/Creat @ 14/0.79  I/O net: -1140  Electrolytes: Replete electrolytes prn.     Hem:   H&H @ 13.1/40.6  DVT prophylaxis: SubQ Heparin       Endo:   A1C: 5.9  Hypothyroidism    -TSH: 1.64    - Continue with levothyroxine.    MSK:   Ambulating well. Continue with PT/OT.     Disposition:   Continue with inpatient care.    Ms. MAIR JUAREZ, 63 year old female, never smoker, w/ hx of HLD, goiter s/p thyroidectomy, who presented with incidental finding of lung nodule, referred by Dr. Ryne Stover (PCP). 10/4 patient underwent a RVATS, RA, RLL lobectomy, MLND and lysis of adhesions. Patient transferred to PACU post-op. POD 1 decreased output from CT. 1X CT on suction no air leak. Pain controlled.     Neuro:   No delirium and focal deficits.   Pain: Prn oxy. Standing tylenol and lidocaine patch.      Cardio:  Afib ppx: Started on 12.5 mg BB.  Vitals over the last 24 hrs:    HR: 54-69    BP: 114//77     Pulm:   POD 1 s/p R VATS with RL lobectomy and MLND.    - Lysis of adhesions during surgery, hold NSAID for bleeding risk.   - Started atrovent.    - 500 output chest tube over 24 hr  IS encouraged. Pulling 750 cc. Sating at 95 on RA  CXR: No pneumo or effusion. CT in good position.     GI:   Tolerating diet well  Prophylaxis: 40 mg pantoprazole.   Bowel: Senna and PEG.      ID:   WBC 11.39. Afebrile. Most likely stress induced, continue to monitor.  Monitor fever curve   Continue cefazolin for post-surgical infection ppx    Renal:   BUN/Creat @ 14/0.79  I/O net: -1140  Electrolytes: Replete electrolytes prn.     Hem:   H&H @ 13.1/40.6  DVT prophylaxis: SubQ Heparin       Endo:   A1C: 5.9  Hypothyroidism    -TSH: 1.64    - Continue with levothyroxine.    MSK:   Ambulating well. Continue with PT/OT.     Disposition:   Continue with inpatient care.

## 2024-10-05 NOTE — CONSULT NOTE ADULT - ASSESSMENT
Ms. MARI JUAREZ, 63 year old female,Mandarin/Fujianese speaking, never smoker, w/ hx of HLD, goiter s/p thyroidectomy on thyroid supplement, who presented with incidental finding of lung nodule, referred by Dr. Ryne Stover (PCP). CT Chest( 9/19/24): solid spiculated RLL lung nodule measuring 2.9 x 2.0 cm (with extension to the pleural surface) on highly suspicious for primary bronchogenic neoplasm. PET/CT (9/24/24): an FDG avid lobulated nodule in the peripheral RLL 2.6 cm SUV=4.9. MRI brain (9/19/24): unremarkable. Pt now underwent robot-assisted thoracoscopic lobectomy of lung using da Jose Xi Right lower lobectomy, mediastinal lymph node dissections. Lysis of adhesions (10/4/24)  POD#1   - 9LA for postop monitoring  - R CT x1 in place   - CXR reviewed   - Incentive spirometry at bedside ( reinforced technique)   - hemodynamic: metoprolol tartrate 12.5 milliGRAM(s) Oral every 12 hours  - postop iv abx ppx: ceFAZolin   IVPB 2000 milliGRAM(s) IV Intermittent every 8 hours  - pain control:   lidocaine   4% Patch 1 Patch Transdermal daily  APAP 650mg po q6hr   - Oxcodone IR 5mg po q6hr prn moderate pain 4-6  - Oxycodone IR 10mg po q6hr prn severe pain 7-10   - Bowel regimen: polyethylene glycol 3350 17 Gram(s) Oral once                           senna 1 Tablet(s) Oral at bedtime  - s/p thyroidectomy: levothyroxine 75 MICROGram(s) Oral daily  - GI ppx: pantoprazole    Tablet 40 milliGRAM(s) Oral before breakfast  - DVT ppx: heparin   Injectable 5000 Unit(s) SubCutaneous every 8 hours    Contact:  PMD Dr. Ryne Stover   [place]: Dr.Anthony Wylie(GI)     Services to follow, thank you for having us participating to her care

## 2024-10-05 NOTE — PROGRESS NOTE ADULT - SUBJECTIVE AND OBJECTIVE BOX
Patient discussed on morning rounds with Dr. Brown    OPERATION & DATE: 10/4 s/p RVATS, RA, RLL lobectomy, MLND    SUBJECTIVE ASSESSMENT:    ***    VITAL SIGNS:  Vital Signs Last 24 Hrs  T(C): 36.8 (05 Oct 2024 05:14), Max: 36.8 (05 Oct 2024 05:14)  T(F): 98.2 (05 Oct 2024 05:14), Max: 98.2 (05 Oct 2024 05:14)  HR: 68 (05 Oct 2024 06:08) (54 - 69)  BP: 130/60 (05 Oct 2024 06:08) (114/59 - 138/70)  BP(mean): 86 (05 Oct 2024 06:08) (81 - 96)  RR: 16 (05 Oct 2024 04:33) (12 - 22)  SpO2: 97% (05 Oct 2024 06:08) (95% - 100%)    Parameters below as of 05 Oct 2024 04:33  Patient On (Oxygen Delivery Method): room air      I&O's Detail    04 Oct 2024 07:01  -  05 Oct 2024 07:00  --------------------------------------------------------  IN:    Lactated Ringers: 60 mL    Lactated Ringers Bolus: 1500 mL  Total IN: 1560 mL    OUT:    Blood Loss (mL): 50 mL    Chest Tube (mL): 500 mL    Voided (mL): 900 mL  Total OUT: 1450 mL    Total NET: 110 mL        CHEST TUBE: 1x CT on suction ***  EPICARDIAL WIRES: No  STITCHES: Yes      PHYSICAL EXAM:  General:  HEENT:  Cardio:  Pulm:  GI:  Extremities:  Vascular:  Incisions:    LABS:                        13.1   11.39 )-----------( 185      ( 05 Oct 2024 05:30 )             40.6     PT/INR - ( 04 Oct 2024 15:31 )   PT: 11.6 sec;   INR: 0.99          PTT - ( 04 Oct 2024 15:31 )  PTT:32.2 sec  10-05    139  |  102  |  14  ----------------------------<  144[H]  4.7   |  26  |  0.79    Ca    8.1[L]      05 Oct 2024 05:30      Urinalysis Basic - ( 05 Oct 2024 05:30 )    Color: x / Appearance: x / SG: x / pH: x  Gluc: 144 mg/dL / Ketone: x  / Bili: x / Urobili: x   Blood: x / Protein: x / Nitrite: x   Leuk Esterase: x / RBC: x / WBC x   Sq Epi: x / Non Sq Epi: x / Bacteria: x      MEDICATIONS  (STANDING):  ceFAZolin   IVPB 2000 milliGRAM(s) IV Intermittent every 8 hours  heparin   Injectable 5000 Unit(s) SubCutaneous every 8 hours  levothyroxine 75 MICROGram(s) Oral daily  lidocaine   4% Patch 1 Patch Transdermal daily  metoprolol tartrate 12.5 milliGRAM(s) Oral every 12 hours  pantoprazole    Tablet 40 milliGRAM(s) Oral before breakfast  polyethylene glycol 3350 17 Gram(s) Oral once  senna 1 Tablet(s) Oral at bedtime    MEDICATIONS  (PRN):  acetaminophen     Tablet .. 650 milliGRAM(s) Oral every 6 hours PRN Mild Pain (1 - 3)  acetaminophen   IVPB .. 1000 milliGRAM(s) IV Intermittent once PRN Temp greater or equal to 38C (100.4F), Mild Pain (1 - 3)  oxyCODONE    IR 5 milliGRAM(s) Oral every 6 hours PRN Severe Pain (7 - 10)    RADIOLOGY & ADDITIONAL TESTS:       Patient discussed on morning rounds with Dr. Brown    OPERATION & DATE: 10/4 s/p RVATS, RA, RLL lobectomy, MLND    SUBJECTIVE ASSESSMENT:     63 year old female reports to be doing well. Endorses mild surgical site pain, which improves with tylenol. Denies chest pain, SOB, headaches, N/V, weakness, or numbness.     VITAL SIGNS:  Vital Signs Last 24 Hrs  T(C): 36.8 (05 Oct 2024 05:14), Max: 36.8 (05 Oct 2024 05:14)  T(F): 98.2 (05 Oct 2024 05:14), Max: 98.2 (05 Oct 2024 05:14)  HR: 68 (05 Oct 2024 06:08) (54 - 69)  BP: 130/60 (05 Oct 2024 06:08) (114/59 - 138/70)  BP(mean): 86 (05 Oct 2024 06:08) (81 - 96)  RR: 16 (05 Oct 2024 04:33) (12 - 22)  SpO2: 97% (05 Oct 2024 06:08) (95% - 100%)    Parameters below as of 05 Oct 2024 04:33  Patient On (Oxygen Delivery Method): room air      I&O's Detail    04 Oct 2024 07:01  -  05 Oct 2024 07:00  --------------------------------------------------------  IN:    Lactated Ringers: 60 mL    Lactated Ringers Bolus: 1500 mL  Total IN: 1560 mL    OUT:    Blood Loss (mL): 50 mL    Chest Tube (mL): 500 mL    Voided (mL): 900 mL  Total OUT: 1450 mL    Total NET: 110 mL        CHEST TUBE: 1x CT on suction with air leak   EPICARDIAL WIRES: No  STITCHES: Yes      PHYSICAL EXAM:  General: Sitting in bed comfortably in NAD  Neuro: A&Ox3, no focal deficits   HEENT: NCAT, EOMI   Cardiac: Regular rate and rhythm, normal S1 and S2. No m/r/g   Pulm: Breathing comfortably on room air. No signs of respiratory distress. Right lung fields with abnormal breath sounds, left lung fields CTA.  Abdomen: Soft, non-distended, non-tender. + bowel sounds   Extremities: Warm and well perfused, no peripheral edema, distal pulses 2+. No calf tenderness.   MSK: Full AROM   Wound: Right chest tube site dressed and dry. Port sites with steri-strips.     LABS:                        13.1   11.39 )-----------( 185      ( 05 Oct 2024 05:30 )             40.6     PT/INR - ( 04 Oct 2024 15:31 )   PT: 11.6 sec;   INR: 0.99          PTT - ( 04 Oct 2024 15:31 )  PTT:32.2 sec  10-05    139  |  102  |  14  ----------------------------<  144[H]  4.7   |  26  |  0.79    Ca    8.1[L]      05 Oct 2024 05:30      Urinalysis Basic - ( 05 Oct 2024 05:30 )    Color: x / Appearance: x / SG: x / pH: x  Gluc: 144 mg/dL / Ketone: x  / Bili: x / Urobili: x   Blood: x / Protein: x / Nitrite: x   Leuk Esterase: x / RBC: x / WBC x   Sq Epi: x / Non Sq Epi: x / Bacteria: x      MEDICATIONS  (STANDING):  ceFAZolin   IVPB 2000 milliGRAM(s) IV Intermittent every 8 hours  heparin   Injectable 5000 Unit(s) SubCutaneous every 8 hours  levothyroxine 75 MICROGram(s) Oral daily  lidocaine   4% Patch 1 Patch Transdermal daily  metoprolol tartrate 12.5 milliGRAM(s) Oral every 12 hours  pantoprazole    Tablet 40 milliGRAM(s) Oral before breakfast  polyethylene glycol 3350 17 Gram(s) Oral once  senna 1 Tablet(s) Oral at bedtime    MEDICATIONS  (PRN):  acetaminophen     Tablet .. 650 milliGRAM(s) Oral every 6 hours PRN Mild Pain (1 - 3)  acetaminophen   IVPB .. 1000 milliGRAM(s) IV Intermittent once PRN Temp greater or equal to 38C (100.4F), Mild Pain (1 - 3)  oxyCODONE    IR 5 milliGRAM(s) Oral every 6 hours PRN Severe Pain (7 - 10)    RADIOLOGY & ADDITIONAL TESTS:       Patient discussed on morning rounds with Dr. Brown. Spoke to patient with Dr. Padilla co-director of  services.     OPERATION & DATE: 10/4 s/p RVATS, RA, RLL lobectomy, MLND    SUBJECTIVE ASSESSMENT:     63 year old female reports to be doing well. Endorses mild surgical site pain, which improves with tylenol. Denies chest pain, SOB, headaches, N/V, weakness, or numbness.     VITAL SIGNS:  Vital Signs Last 24 Hrs  T(C): 36.8 (05 Oct 2024 05:14), Max: 36.8 (05 Oct 2024 05:14)  T(F): 98.2 (05 Oct 2024 05:14), Max: 98.2 (05 Oct 2024 05:14)  HR: 68 (05 Oct 2024 06:08) (54 - 69)  BP: 130/60 (05 Oct 2024 06:08) (114/59 - 138/70)  BP(mean): 86 (05 Oct 2024 06:08) (81 - 96)  RR: 16 (05 Oct 2024 04:33) (12 - 22)  SpO2: 97% (05 Oct 2024 06:08) (95% - 100%)    Parameters below as of 05 Oct 2024 04:33  Patient On (Oxygen Delivery Method): room air      I&O's Detail    04 Oct 2024 07:01  -  05 Oct 2024 07:00  --------------------------------------------------------  IN:    Lactated Ringers: 60 mL    Lactated Ringers Bolus: 1500 mL  Total IN: 1560 mL    OUT:    Blood Loss (mL): 50 mL    Chest Tube (mL): 500 mL    Voided (mL): 900 mL  Total OUT: 1450 mL    Total NET: 110 mL        CHEST TUBE: 1x CT on suction with air leak   EPICARDIAL WIRES: No  STITCHES: Yes      PHYSICAL EXAM:  General: Sitting in bed comfortably in NAD  Neuro: A&Ox3, no focal deficits   HEENT: NCAT, EOMI   Cardiac: Regular rate and rhythm, normal S1 and S2. No m/r/g   Pulm: Breathing comfortably on room air. No signs of respiratory distress. Right lung fields with abnormal breath sounds, left lung fields CTA.  Abdomen: Soft, non-distended, non-tender. + bowel sounds   Extremities: Warm and well perfused, no peripheral edema, distal pulses 2+. No calf tenderness.   MSK: Full AROM   Wound: Right chest tube site dressed and dry. Port sites with steri-strips.     LABS:                        13.1   11.39 )-----------( 185      ( 05 Oct 2024 05:30 )             40.6     PT/INR - ( 04 Oct 2024 15:31 )   PT: 11.6 sec;   INR: 0.99          PTT - ( 04 Oct 2024 15:31 )  PTT:32.2 sec  10-05    139  |  102  |  14  ----------------------------<  144[H]  4.7   |  26  |  0.79    Ca    8.1[L]      05 Oct 2024 05:30      Urinalysis Basic - ( 05 Oct 2024 05:30 )    Color: x / Appearance: x / SG: x / pH: x  Gluc: 144 mg/dL / Ketone: x  / Bili: x / Urobili: x   Blood: x / Protein: x / Nitrite: x   Leuk Esterase: x / RBC: x / WBC x   Sq Epi: x / Non Sq Epi: x / Bacteria: x      MEDICATIONS  (STANDING):  ceFAZolin   IVPB 2000 milliGRAM(s) IV Intermittent every 8 hours  heparin   Injectable 5000 Unit(s) SubCutaneous every 8 hours  levothyroxine 75 MICROGram(s) Oral daily  lidocaine   4% Patch 1 Patch Transdermal daily  metoprolol tartrate 12.5 milliGRAM(s) Oral every 12 hours  pantoprazole    Tablet 40 milliGRAM(s) Oral before breakfast  polyethylene glycol 3350 17 Gram(s) Oral once  senna 1 Tablet(s) Oral at bedtime    MEDICATIONS  (PRN):  acetaminophen     Tablet .. 650 milliGRAM(s) Oral every 6 hours PRN Mild Pain (1 - 3)  acetaminophen   IVPB .. 1000 milliGRAM(s) IV Intermittent once PRN Temp greater or equal to 38C (100.4F), Mild Pain (1 - 3)  oxyCODONE    IR 5 milliGRAM(s) Oral every 6 hours PRN Severe Pain (7 - 10)    RADIOLOGY & ADDITIONAL TESTS:

## 2024-10-05 NOTE — CONSULT NOTE ADULT - SUBJECTIVE AND OBJECTIVE BOX
HPI:  Ms. MARI JUAREZ, 63 year old female,Mandarin/Fujianese speaking, never smoker, w/ hx of HLD, goiter s/p thyroidectomy, who presented with incidental finding of lung nodule, referred by Dr. Ryne Stover (PCP). CT Chest( 9/19/24): solid spiculated RLL lung nodule measuring 2.9 x 2.0 cm (with extension to the pleural surface) on highly suspicious for primary bronchogenic neoplasm. PET/CT (9/24/24): an FDG avid lobulated nodule in the peripheral RLL 2.6 cm SUV=4.9. MRI brain (9/19/24): unremarkable. Pt now underwent robot-assisted thoracoscopic lobectomy of lung using da Jose Xi Right lower lobectomy, mediastinal lymph node dissections. Lysis of adhesions (10/4/24)  POD#1     PAST MEDICAL & SURGICAL HISTORY:  Hyperlipidemia      Hypothyroidism      H/O goiter      Lung nodule      H/O thyroidectomy      History of open reduction and internal fixation (ORIF) procedure  right humerus        Home Medications:  levothyroxine 75 mcg (0.075 mg) oral tablet: 1 tab(s) orally once a day (04 Oct 2024 08:13)  omeprazole 40 mg oral delayed release capsule: 1 cap(s) orally once a day (04 Oct 2024 08:13)    Allergies    No Known Allergies    Intolerances      FAMILY HISTORY:  No pertinent family history in first degree relatives      Social History:  no illicit drug use  no alcohol use  never a smoker (01 Oct 2024 10:35)      REVIEW OF SYSTEMS:  CONSTITUTIONAL: No fever, weight loss  EYES: No eye pain, or discharge  ENMT:  No tinnitus, vertigo  NECK: No pain or stiffness  RESPIRATORY: No cough, No dyspnea  CARDIOVASCULAR: No chest pain, or leg swelling  GASTROINTESTINAL: No abdominal pain. No diarrhea ;No melena or hematochezia.  GENITOURINARY: No dysuria, frequency, or hematuria  NEUROLOGICAL: No numbness, or tremors  SKIN: No itching, burning, rashes, or lesions   ENDOCRINE: No heat or cold intolerance;  MUSCULOSKELETAL: No joint pain or swelling;   PSYCHIATRIC: No mood swings, or difficulty sleeping  HEME/LYMPH: No easy bruising, or bleeding gums  ALLERGY AND IMMUNOLOGIC: No hives or eczema    Diet, Regular (10-04-24 @ 18:08) [Active]      CURRENT MEDICATIONS:   ceFAZolin   IVPB 2000 milliGRAM(s) IV Intermittent every 8 hours  heparin   Injectable 5000 Unit(s) SubCutaneous every 8 hours  levothyroxine 75 MICROGram(s) Oral daily  lidocaine   4% Patch 1 Patch Transdermal daily  metoprolol tartrate 12.5 milliGRAM(s) Oral every 12 hours  pantoprazole    Tablet 40 milliGRAM(s) Oral before breakfast  polyethylene glycol 3350 17 Gram(s) Oral once  senna 1 Tablet(s) Oral at bedtime      VITAL SIGNS, INS/OUTS (last 24 hours):  Vital Signs Last 24 Hrs  T(C): 36.5 (05 Oct 2024 09:10), Max: 36.8 (05 Oct 2024 05:14)  T(F): 97.7 (05 Oct 2024 09:10), Max: 98.2 (05 Oct 2024 05:14)  HR: 58 (05 Oct 2024 09:21) (54 - 69)  BP: 132/65 (05 Oct 2024 09:21) (114/59 - 138/70)  BP(mean): 92 (05 Oct 2024 09:21) (81 - 96)  RR: 18 (05 Oct 2024 09:21) (12 - 22)  SpO2: 97% (05 Oct 2024 09:21) (95% - 100%)    Parameters below as of 05 Oct 2024 09:21  Patient On (Oxygen Delivery Method): room air      I&O's Summary    04 Oct 2024 07:01  -  05 Oct 2024 07:00  --------------------------------------------------------  IN: 1560 mL / OUT: 1450 mL / NET: 110 mL    05 Oct 2024 07:01  -  05 Oct 2024 11:56  --------------------------------------------------------  IN: 0 mL / OUT: 75 mL / NET: -75 mL        PHYSICAL EXAM:  Gen: Reclining in bed at time of exam, appears stated age  HEENT: NCAT, MMM, clear OP  Neck: supple, trachea at midline  CV: RRR, +S1/S2  Pulm: adequate respiratory effort, no increase in work of breathing  Abd: soft, NTND  Skin: warm and dry,  Ext: WWP, no LE edema  Neuro: AOx3, no gross focal neurological deficits  Psych: affect and behavior appropriate, pleasant at time of interview    BASIC LABS:                        13.1   11.39 )-----------( 185      ( 05 Oct 2024 05:30 )             40.6     10-05    139  |  102  |  14  ----------------------------<  144[H]  4.7   |  26  |  0.79    Ca    8.1[L]      05 Oct 2024 05:30      PT/INR - ( 04 Oct 2024 15:31 )   PT: 11.6 sec;   INR: 0.99          PTT - ( 04 Oct 2024 15:31 )  PTT:32.2 sec  Urinalysis Basic - ( 05 Oct 2024 05:30 )    Color: x / Appearance: x / SG: x / pH: x  Gluc: 144 mg/dL / Ketone: x  / Bili: x / Urobili: x   Blood: x / Protein: x / Nitrite: x   Leuk Esterase: x / RBC: x / WBC x   Sq Epi: x / Non Sq Epi: x / Bacteria: x      CAPILLARY BLOOD GLUCOSE      POCT Blood Glucose.: 161 mg/dL (04 Oct 2024 21:31)      OTHER LABS:        MICRODATA:      IMAGING:    EKG:    #Diet - Diet, Regular (10-04-24 @ 18:08) [Active]        #DVT PPx -

## 2024-10-06 LAB
ANION GAP SERPL CALC-SCNC: 8 MMOL/L — SIGNIFICANT CHANGE UP (ref 5–17)
BUN SERPL-MCNC: 12 MG/DL — SIGNIFICANT CHANGE UP (ref 7–23)
CALCIUM SERPL-MCNC: 7.4 MG/DL — LOW (ref 8.4–10.5)
CHLORIDE SERPL-SCNC: 105 MMOL/L — SIGNIFICANT CHANGE UP (ref 96–108)
CO2 SERPL-SCNC: 28 MMOL/L — SIGNIFICANT CHANGE UP (ref 22–31)
CREAT SERPL-MCNC: 0.76 MG/DL — SIGNIFICANT CHANGE UP (ref 0.5–1.3)
EGFR: 86 ML/MIN/1.73M2 — SIGNIFICANT CHANGE UP
GLUCOSE SERPL-MCNC: 105 MG/DL — HIGH (ref 70–99)
HCT VFR BLD CALC: 39 % — SIGNIFICANT CHANGE UP (ref 34.5–45)
HGB BLD-MCNC: 12.4 G/DL — SIGNIFICANT CHANGE UP (ref 11.5–15.5)
MAGNESIUM SERPL-MCNC: 2.3 MG/DL — SIGNIFICANT CHANGE UP (ref 1.6–2.6)
MCHC RBC-ENTMCNC: 29.1 PG — SIGNIFICANT CHANGE UP (ref 27–34)
MCHC RBC-ENTMCNC: 31.8 GM/DL — LOW (ref 32–36)
MCV RBC AUTO: 91.5 FL — SIGNIFICANT CHANGE UP (ref 80–100)
NRBC # BLD: 0 /100 WBCS — SIGNIFICANT CHANGE UP (ref 0–0)
PHOSPHATE SERPL-MCNC: 2.9 MG/DL — SIGNIFICANT CHANGE UP (ref 2.5–4.5)
PLATELET # BLD AUTO: 152 K/UL — SIGNIFICANT CHANGE UP (ref 150–400)
POTASSIUM SERPL-MCNC: 4.3 MMOL/L — SIGNIFICANT CHANGE UP (ref 3.5–5.3)
POTASSIUM SERPL-SCNC: 4.3 MMOL/L — SIGNIFICANT CHANGE UP (ref 3.5–5.3)
RBC # BLD: 4.26 M/UL — SIGNIFICANT CHANGE UP (ref 3.8–5.2)
RBC # FLD: 12.3 % — SIGNIFICANT CHANGE UP (ref 10.3–14.5)
SODIUM SERPL-SCNC: 141 MMOL/L — SIGNIFICANT CHANGE UP (ref 135–145)
WBC # BLD: 9.62 K/UL — SIGNIFICANT CHANGE UP (ref 3.8–10.5)
WBC # FLD AUTO: 9.62 K/UL — SIGNIFICANT CHANGE UP (ref 3.8–10.5)

## 2024-10-06 PROCEDURE — 71045 X-RAY EXAM CHEST 1 VIEW: CPT | Mod: 26

## 2024-10-06 PROCEDURE — 99232 SBSQ HOSP IP/OBS MODERATE 35: CPT

## 2024-10-06 RX ADMIN — Medication 650 MILLIGRAM(S): at 17:07

## 2024-10-06 RX ADMIN — Medication 12.5 MILLIGRAM(S): at 17:06

## 2024-10-06 RX ADMIN — LIDOCAINE 1 PATCH: 50 CREAM TOPICAL at 23:00

## 2024-10-06 RX ADMIN — Medication 12.5 MILLIGRAM(S): at 05:10

## 2024-10-06 RX ADMIN — LIDOCAINE 1 PATCH: 50 CREAM TOPICAL at 16:56

## 2024-10-06 RX ADMIN — Medication 5000 UNIT(S): at 14:44

## 2024-10-06 RX ADMIN — Medication 650 MILLIGRAM(S): at 11:36

## 2024-10-06 RX ADMIN — Medication 650 MILLIGRAM(S): at 05:00

## 2024-10-06 RX ADMIN — Medication 5000 UNIT(S): at 05:55

## 2024-10-06 RX ADMIN — Medication 5000 UNIT(S): at 21:53

## 2024-10-06 RX ADMIN — Medication 75 MICROGRAM(S): at 05:55

## 2024-10-06 RX ADMIN — Medication 650 MILLIGRAM(S): at 23:19

## 2024-10-06 RX ADMIN — LIDOCAINE 1 PATCH: 50 CREAM TOPICAL at 00:11

## 2024-10-06 RX ADMIN — Medication 1 TABLET(S): at 21:53

## 2024-10-06 RX ADMIN — LIDOCAINE 1 PATCH: 50 CREAM TOPICAL at 11:36

## 2024-10-06 RX ADMIN — PANTOPRAZOLE SODIUM 40 MILLIGRAM(S): 40 TABLET, DELAYED RELEASE ORAL at 06:57

## 2024-10-06 RX ADMIN — Medication 650 MILLIGRAM(S): at 12:51

## 2024-10-06 RX ADMIN — Medication 650 MILLIGRAM(S): at 06:00

## 2024-10-06 NOTE — PROGRESS NOTE ADULT - SUBJECTIVE AND OBJECTIVE BOX
Patient discussed on morning rounds with Dr. Brown    OPERATION & DATE:  10/4 s/p RVATS, RA, RLL lobectomy, MLND    SUBJECTIVE ASSESSMENT:     63 year old female reports to be doing well. Endorses mild surgical site pain, which improves with tylenol. Denies chest pain, SOB, headaches, N/V, weakness, or numbness.     VITAL SIGNS:  Vital Signs Last 24 Hrs  T(C): 36 (06 Oct 2024 05:01), Max: 36.6 (05 Oct 2024 17:20)  T(F): 96.8 (06 Oct 2024 05:01), Max: 97.8 (05 Oct 2024 17:20)  HR: 60 (06 Oct 2024 05:00) (58 - 64)  BP: 128/60 (06 Oct 2024 05:00) (109/53 - 132/65)  BP(mean): 86 (06 Oct 2024 05:00) (76 - 92)  RR: 16 (06 Oct 2024 05:00) (16 - 18)  SpO2: 95% (06 Oct 2024 05:00) (95% - 97%)    Parameters below as of 06 Oct 2024 05:00  Patient On (Oxygen Delivery Method): room air      I&O's Detail    05 Oct 2024 07:01  -  06 Oct 2024 07:00  --------------------------------------------------------  IN:  Total IN: 0 mL    OUT:    Chest Tube (mL): 450 mL    Voided (mL): 360 mL  Total OUT: 810 mL    Total NET: -810 mL      06 Oct 2024 07:01  -  06 Oct 2024 07:50  --------------------------------------------------------  IN:  Total IN: 0 mL    OUT:    Chest Tube (mL): 80 mL  Total OUT: 80 mL    Total NET: -80 mL        CHEST TUBE: 1x CT on WS, no leak  STITCHES: yes    PHYSICAL EXAM:  General: Sitting in bed comfortably in NAD  Neuro: A&Ox3, no focal deficits   HEENT: NCAT, EOMI   Cardiac: Regular rate and rhythm, normal S1 and S2. No m/r/g   Pulm: Breathing comfortably on room air. No signs of respiratory distress. Right lung fields with diffuse mild crackles. Left lung fields CTA.   Abdomen: Soft, non-distended, non-tender. + bowel sounds   Extremities: Warm and well perfused, no peripheral edema, distal pulses 2+. No calf tenderness. SCDs and TEDs in place  MSK: Full AROM   Wound: Chest tube in place with gauze over, not saturated. Port sites without erythema.     LABS:                        12.4   9.62  )-----------( 152      ( 06 Oct 2024 05:30 )             39.0     PT/INR - ( 04 Oct 2024 15:31 )   PT: 11.6 sec;   INR: 0.99          PTT - ( 04 Oct 2024 15:31 )  PTT:32.2 sec  10-06    141  |  105  |  12  ----------------------------<  105[H]  4.3   |  28  |  0.76    Ca    7.4[L]      06 Oct 2024 05:30  Phos  2.9     10-06  Mg     2.3     10-06      Urinalysis Basic - ( 06 Oct 2024 05:30 )    Color: x / Appearance: x / SG: x / pH: x  Gluc: 105 mg/dL / Ketone: x  / Bili: x / Urobili: x   Blood: x / Protein: x / Nitrite: x   Leuk Esterase: x / RBC: x / WBC x   Sq Epi: x / Non Sq Epi: x / Bacteria: x      MEDICATIONS  (STANDING):  acetaminophen     Tablet .. 650 milliGRAM(s) Oral every 6 hours  heparin   Injectable 5000 Unit(s) SubCutaneous every 8 hours  levothyroxine 75 MICROGram(s) Oral daily  lidocaine   4% Patch 1 Patch Transdermal daily  metoprolol tartrate 12.5 milliGRAM(s) Oral every 12 hours  pantoprazole    Tablet 40 milliGRAM(s) Oral before breakfast  senna 1 Tablet(s) Oral at bedtime    MEDICATIONS  (PRN):  oxyCODONE    IR 5 milliGRAM(s) Oral every 6 hours PRN Moderate Pain (4 - 6)  oxyCODONE    IR 10 milliGRAM(s) Oral every 6 hours PRN Severe Pain (7 - 10)    RADIOLOGY & ADDITIONAL TESTS:

## 2024-10-06 NOTE — PROGRESS NOTE ADULT - ASSESSMENT
Ms. MARI JUAREZ, 63 year old female,Mandarin/Fujianese speaking, never smoker, w/ hx of HLD, goiter s/p thyroidectomy on thyroid supplement, who presented with incidental finding of lung nodule, referred by Dr. Ryne Stover (PCP). CT Chest( 9/19/24): solid spiculated RLL lung nodule measuring 2.9 x 2.0 cm (with extension to the pleural surface) on highly suspicious for primary bronchogenic neoplasm. PET/CT (9/24/24): an FDG avid lobulated nodule in the peripheral RLL 2.6 cm SUV=4.9. MRI brain (9/19/24): unremarkable. Pt now underwent robot-assisted thoracoscopic lobectomy of lung using da Jose Xi Right lower lobectomy, mediastinal lymph node dissections. Lysis of adhesions (10/4/24)  POD#2     - 9LA for postop monitoring  - R CT x1 in place   - CXR reviewed   - Incentive spirometry at bedside ( reinforced technique)   - hemodynamic: metoprolol tartrate 12.5 milliGRAM(s) Oral every 12 hours  - postop iv abx ppx: ceFAZolin   IVPB 2000 milliGRAM(s) IV Intermittent every 8 hours  - pain control:   lidocaine   4% Patch 1 Patch Transdermal daily  APAP 650mg po q6hr   - Oxcodone IR 5mg po q6hr prn moderate pain 4-6  - Oxycodone IR 10mg po q6hr prn severe pain 7-10   - Bowel regimen: polyethylene glycol 3350 17 Gram(s) Oral once                           senna 1 Tablet(s) Oral at bedtime  - s/p thyroidectomy: levothyroxine 75 MICROGram(s) Oral daily  - GI ppx: pantoprazole    Tablet 40 milliGRAM(s) Oral before breakfast  - DVT ppx: heparin   Injectable 5000 Unit(s) SubCutaneous every 8 hours    Contact:  PMD Dr. Ryne Stover   [place]: Dr.Anthony Wylie(GI)     Services to follow, thank you for having us participating to her care

## 2024-10-06 NOTE — PROGRESS NOTE ADULT - ASSESSMENT
Ms. MARI JUAREZ, 63 year old female, never smoker, w/ hx of HLD, goiter s/p thyroidectomy, who presented with incidental finding of lung nodule, referred by Dr. Ryne Stover (PCP). 10/4 patient underwent a RVATS, RA, RLL lobectomy, MLND and lysis of adhesions. Patient transferred to PACU post-op. POD 1 decreased output from CT. 1X CT on suction no air leak. POD2 1xCT on WS, no air leak. Continued CT drainage, 450 cc over 24 hr. Pain controlled.     Neuro:   No delirium and focal deficits.   Pain: Prn oxy. Standing tylenol and lidocaine patch.      Cardio:  Afib ppx: Continue 12.5 mg BB.  Vitals over the last 24 hrs:    HR: 58-64    BP: 109//65     Pulm:   POD 1 s/p R VATS with RL lobectomy and MLND.    - Lysis of adhesions during surgery, hold NSAID for bleeding risk.   - Started atrovent.    - 450 output chest tube over 24 hr  IS encouraged. 750 cc on IS. Sating at 95 on RA  CXR: Unremarkable, unchanged from yesterday.     GI:   Tolerating diet well  Prophylaxis: 40 mg pantoprazole.   Bowel: Senna and PEG.      ID:   WBC 9.62. Afebrile.   Monitor fever curve     Renal:   BUN/Creat @ 12/0.76  I/O net: -810  Electrolytes: Replete electrolytes prn.     Hem:   H&H @ 12.4/39.0  DVT prophylaxis: SubQ Heparin       Endo:   A1C: 5.9  Hypothyroidism    -TSH: 1.64    - Continue with levothyroxine.    MSK:   Ambulating well. Continue with PT/OT.     Disposition:   Continue with inpatient care.

## 2024-10-06 NOTE — PROGRESS NOTE ADULT - SUBJECTIVE AND OBJECTIVE BOX
Patient is a 64y old  Female who presents with a chief complaint of elective surgery: RVATS, RA, RLL lobectomy, MLND (06 Oct 2024 07:50)    INTERVAL EVENTS: NAEON    SUBJECTIVE:  Patient was seen and examined at bedside. Pain under control on tylenol prn     Review of systems: No fever, chills, dizziness, HA, Changes in vision, CP, dyspnea, nausea or vomiting, dysuria, changes in bowel movements, LE edema. Rest of 12 point Review of systems negative unless otherwise documented elsewhere in note.     Diet, Regular (10-04-24 @ 18:08) [Active]      MEDICATIONS:  MEDICATIONS  (STANDING):  acetaminophen     Tablet .. 650 milliGRAM(s) Oral every 6 hours  heparin   Injectable 5000 Unit(s) SubCutaneous every 8 hours  levothyroxine 75 MICROGram(s) Oral daily  lidocaine   4% Patch 1 Patch Transdermal daily  metoprolol tartrate 12.5 milliGRAM(s) Oral every 12 hours  pantoprazole    Tablet 40 milliGRAM(s) Oral before breakfast  senna 1 Tablet(s) Oral at bedtime    MEDICATIONS  (PRN):  oxyCODONE    IR 10 milliGRAM(s) Oral every 6 hours PRN Severe Pain (7 - 10)  oxyCODONE    IR 5 milliGRAM(s) Oral every 6 hours PRN Moderate Pain (4 - 6)      Allergies    No Known Allergies    Intolerances        OBJECTIVE:  Vital Signs Last 24 Hrs  T(C): 36.5 (06 Oct 2024 13:50), Max: 36.6 (05 Oct 2024 17:20)  T(F): 97.7 (06 Oct 2024 13:50), Max: 97.8 (05 Oct 2024 17:20)  HR: 57 (06 Oct 2024 11:45) (56 - 64)  BP: 125/60 (06 Oct 2024 11:45) (109/53 - 132/63)  BP(mean): 85 (06 Oct 2024 11:45) (76 - 91)  RR: 18 (06 Oct 2024 11:45) (16 - 18)  SpO2: 96% (06 Oct 2024 11:45) (95% - 97%)    Parameters below as of 06 Oct 2024 11:45  Patient On (Oxygen Delivery Method): room air      I&O's Summary    05 Oct 2024 07:01  -  06 Oct 2024 07:00  --------------------------------------------------------  IN: 0 mL / OUT: 810 mL / NET: -810 mL    06 Oct 2024 07:01  -  06 Oct 2024 16:05  --------------------------------------------------------  IN: 240 mL / OUT: 100 mL / NET: 140 mL        PHYSICAL EXAM:  Gen: Reclining in bed at time of exam, appears stated age  HEENT: NCAT, MMM, clear OP  Neck: supple, trachea at midline  CV: RRR, +S1/S2  Pulm: adequate respiratory effort, no increase in work of breathing, R CT x1 in place   Abd: soft, NTND  Skin: warm and dry,   Ext: WWP, no LE edema  Neuro: AOx3, no gross focal neurological deficits  Psych: affect and behavior appropriate, pleasant at time of interview  :     LABS:                        12.4   9.62  )-----------( 152      ( 06 Oct 2024 05:30 )             39.0     10-06    141  |  105  |  12  ----------------------------<  105[H]  4.3   |  28  |  0.76    Ca    7.4[L]      06 Oct 2024 05:30  Phos  2.9     10-06  Mg     2.3     10-06          CAPILLARY BLOOD GLUCOSE        Urinalysis Basic - ( 06 Oct 2024 05:30 )    Color: x / Appearance: x / SG: x / pH: x  Gluc: 105 mg/dL / Ketone: x  / Bili: x / Urobili: x   Blood: x / Protein: x / Nitrite: x   Leuk Esterase: x / RBC: x / WBC x   Sq Epi: x / Non Sq Epi: x / Bacteria: x        MICRODATA:      RADIOLOGY/OTHER STUDIES:    PCP  Pharmacy:   Emergency contact:

## 2024-10-07 ENCOUNTER — TRANSCRIPTION ENCOUNTER (OUTPATIENT)
Age: 64
End: 2024-10-07

## 2024-10-07 ENCOUNTER — NON-APPOINTMENT (OUTPATIENT)
Age: 64
End: 2024-10-07

## 2024-10-07 VITALS
HEART RATE: 66 BPM | RESPIRATION RATE: 18 BRPM | OXYGEN SATURATION: 96 % | DIASTOLIC BLOOD PRESSURE: 63 MMHG | SYSTOLIC BLOOD PRESSURE: 139 MMHG

## 2024-10-07 PROCEDURE — 71045 X-RAY EXAM CHEST 1 VIEW: CPT | Mod: 26,XE

## 2024-10-07 PROCEDURE — 71046 X-RAY EXAM CHEST 2 VIEWS: CPT | Mod: 26

## 2024-10-07 PROCEDURE — 99232 SBSQ HOSP IP/OBS MODERATE 35: CPT

## 2024-10-07 RX ORDER — METOPROLOL TARTRATE 50 MG
0.5 TABLET ORAL
Qty: 30 | Refills: 0
Start: 2024-10-07 | End: 2024-11-05

## 2024-10-07 RX ORDER — OXYCODONE HYDROCHLORIDE 30 MG/1
1 TABLET, FILM COATED, EXTENDED RELEASE ORAL
Qty: 10 | Refills: 0
Start: 2024-10-07 | End: 2024-10-11

## 2024-10-07 RX ORDER — SENNOSIDES 8.6 MG
1 TABLET ORAL
Qty: 7 | Refills: 0
Start: 2024-10-07 | End: 2024-10-13

## 2024-10-07 RX ORDER — PANTOPRAZOLE SODIUM 40 MG/1
1 TABLET, DELAYED RELEASE ORAL
Qty: 30 | Refills: 0
Start: 2024-10-07 | End: 2024-11-05

## 2024-10-07 RX ORDER — BISACODYL 5 MG/1
1 TABLET, COATED ORAL
Qty: 7 | Refills: 0
Start: 2024-10-07 | End: 2024-10-13

## 2024-10-07 RX ORDER — ACETAMINOPHEN 325 MG
2 TABLET ORAL
Qty: 112 | Refills: 0
Start: 2024-10-07 | End: 2024-10-20

## 2024-10-07 RX ADMIN — LIDOCAINE 1 PATCH: 50 CREAM TOPICAL at 11:14

## 2024-10-07 RX ADMIN — Medication 650 MILLIGRAM(S): at 06:29

## 2024-10-07 RX ADMIN — Medication 5000 UNIT(S): at 06:30

## 2024-10-07 RX ADMIN — Medication 650 MILLIGRAM(S): at 00:00

## 2024-10-07 RX ADMIN — Medication 75 MICROGRAM(S): at 06:29

## 2024-10-07 RX ADMIN — Medication 12.5 MILLIGRAM(S): at 06:30

## 2024-10-07 RX ADMIN — Medication 650 MILLIGRAM(S): at 06:50

## 2024-10-07 RX ADMIN — PANTOPRAZOLE SODIUM 40 MILLIGRAM(S): 40 TABLET, DELAYED RELEASE ORAL at 06:30

## 2024-10-07 NOTE — DISCHARGE NOTE NURSING/CASE MANAGEMENT/SOCIAL WORK - PATIENT PORTAL LINK FT
You can access the FollowMyHealth Patient Portal offered by [facility] by registering at the following website: [email or web address]. By joining [facility]’s FollowMyHealth portal, you will also be able to view your health information using other applications (apps) compatible with our system.

## 2024-10-07 NOTE — DISCHARGE NOTE PROVIDER - NSDCFUSCHEDAPPT_GEN_ALL_CORE_FT
[facility]  ULTRASND [address] [facility]  Scheduled Appointment: 10/11/2024    [facility]  BRSTIMAG [address] [facility]  Scheduled Appointment: 10/11/2024

## 2024-10-07 NOTE — DISCHARGE NOTE PROVIDER - NSDCFUADDAPPT_GEN_ALL_CORE_FT
-You will receive a phone call from our office informing you of your follow up appointments. If you do not hear from our office by 10/10/24, please call [phone].   - You may remove the dressing from your right chest on 10/9/24. The stitch will be removed in your follow up appointment with

## 2024-10-07 NOTE — DISCHARGE NOTE PROVIDER - NSDCMRMEDTOKEN_GEN_ALL_CORE_FT
levothyroxine 75 mcg (0.075 mg) oral tablet: 1 tab(s) orally once a day  omeprazole 40 mg oral delayed release capsule: 1 cap(s) orally once a day   acetaminophen 325 mg oral tablet: 2 tab(s) orally every 6 hours as needed for  mild pain  bisacodyl 5 mg oral delayed release tablet: 1 tab(s) orally once a day as needed for  constipation  ibuprofen 600 mg oral tablet: 1 tab(s) orally every 8 hours as needed for  severe pain  levothyroxine 75 mcg (0.075 mg) oral tablet: 1 tab(s) orally once a day  metoprolol tartrate 25 mg oral tablet: 0.5 tab(s) orally every 12 hours  oxyCODONE 5 mg oral tablet: 1 tab(s) orally every 6 hours as needed for  severe pain MDD: 3  pantoprazole 40 mg oral delayed release tablet: 1 tab(s) orally once a day (before a meal)  senna leaf extract oral tablet: 1 tab(s) orally once a day (at bedtime) as needed for  constipation

## 2024-10-07 NOTE — DISCHARGE NOTE PROVIDER - CARE PROVIDER_API CALL
[facility]  Internal Medicine  [address], [address]  [place] [address]  Phone: [phone]  Fax: [phone]  Follow Up Time: 2 weeks    Ajay Brown  Thoracic and Cardiac Surgery  [address] [place] [address], [address] [place] [address]  Phone: [phone]  Fax: [phone]  Follow Up Time: 1 week

## 2024-10-07 NOTE — DISCHARGE NOTE PROVIDER - PROVIDER TOKENS
PROVIDER:[TOKEN:[[id number]],FOLLOWUP:[2 weeks]],PROVIDER:[TOKEN:[[id number]],FOLLOWUP:[1 week]]

## 2024-10-07 NOTE — PROGRESS NOTE ADULT - SUBJECTIVE AND OBJECTIVE BOX
Patient is a 64y old  Female who presents with a chief complaint of elective surgery: RVATS, RA, RLL lobectomy, MLND (07 Oct 2024 10:11)    INTERVAL EVENTS:NAEON    SUBJECTIVE:  Patient was seen and examined at bedside with CTS . Pain controlled on APAP    Review of systems: No fever, chills, dizziness, HA, Changes in vision, CP, dyspnea, nausea or vomiting, dysuria, changes in bowel movements, LE edema. Rest of 12 point Review of systems negative unless otherwise documented elsewhere in note.     Diet, Regular (10-04-24 @ 18:08) [Active]      MEDICATIONS:  MEDICATIONS  (STANDING):  acetaminophen     Tablet .. 650 milliGRAM(s) Oral every 6 hours  heparin   Injectable 5000 Unit(s) SubCutaneous every 8 hours  levothyroxine 75 MICROGram(s) Oral daily  lidocaine   4% Patch 1 Patch Transdermal daily  metoprolol tartrate 12.5 milliGRAM(s) Oral every 12 hours  pantoprazole    Tablet 40 milliGRAM(s) Oral before breakfast  senna 1 Tablet(s) Oral at bedtime    MEDICATIONS  (PRN):  oxyCODONE    IR 5 milliGRAM(s) Oral every 6 hours PRN Moderate Pain (4 - 6)  oxyCODONE    IR 10 milliGRAM(s) Oral every 6 hours PRN Severe Pain (7 - 10)      Allergies    No Known Allergies    Intolerances        OBJECTIVE:  Vital Signs Last 24 Hrs  T(C): 36.3 (07 Oct 2024 13:01), Max: 36.9 (06 Oct 2024 21:33)  T(F): 97.4 (07 Oct 2024 13:01), Max: 98.5 (06 Oct 2024 21:33)  HR: 66 (07 Oct 2024 13:10) (59 - 68)  BP: 139/63 (07 Oct 2024 13:10) (100/49 - 139/63)  BP(mean): 90 (07 Oct 2024 13:10) (70 - 91)  RR: 18 (07 Oct 2024 13:10) (16 - 18)  SpO2: 96% (07 Oct 2024 13:10) (95% - 96%)    Parameters below as of 07 Oct 2024 13:10  Patient On (Oxygen Delivery Method): room air      I&O's Summary    06 Oct 2024 07:01  -  07 Oct 2024 07:00  --------------------------------------------------------  IN: 360 mL / OUT: 280 mL / NET: 80 mL    07 Oct 2024 07:01  -  07 Oct 2024 14:08  --------------------------------------------------------  IN: 0 mL / OUT: 20 mL / NET: -20 mL        PHYSICAL EXAM:  Gen: Reclining in bed at time of exam, appears stated age  HEENT: NCAT, MMM, clear OP  Neck: supple, trachea at midline  CV: RRR, +S1/S2  Pulm: adequate respiratory effort, no increase in work of breathing  Abd: soft, NTND  Skin: warm and dry,   Ext: WWP, no LE edema  Neuro: AOx3, no gross focal neurological deficits  Psych: affect and behavior appropriate, pleasant at time of interview  :     LABS:                        12.4   9.62  )-----------( 152      ( 06 Oct 2024 05:30 )             39.0     10-06    141  |  105  |  12  ----------------------------<  105[H]  4.3   |  28  |  0.76    Ca    7.4[L]      06 Oct 2024 05:30  Phos  2.9     10-06  Mg     2.3     10-06          CAPILLARY BLOOD GLUCOSE        Urinalysis Basic - ( 06 Oct 2024 05:30 )    Color: x / Appearance: x / SG: x / pH: x  Gluc: 105 mg/dL / Ketone: x  / Bili: x / Urobili: x   Blood: x / Protein: x / Nitrite: x   Leuk Esterase: x / RBC: x / WBC x   Sq Epi: x / Non Sq Epi: x / Bacteria: x        MICRODATA:      RADIOLOGY/OTHER STUDIES:    PCP  Pharmacy:   Emergency contact:

## 2024-10-07 NOTE — DISCHARGE NOTE PROVIDER - HOSPITAL COURSE
Patient discussed on morning rounds with Dr. Mayorga   Operation Date: S/p R VATS RLL lobectomy w/ MLND, lysis of adhesions (10/4/24)    Primary Surgeon/Attending MD:   Referring Physician: Dr.Dong Nolan Stover (PCP)  _ _ _ _ _ _ _ _ _ _ _ _   HOSPITAL COURSE:   62 yo F w/ PMHx of HLD, goiter (s/p thyroidectomy), who was referred by Dr.Dong Nolan Stover after incidental finding of lung nodule. Patient now s/p R VATS RA RLL lobectomy, MLND and lysis of adhesions (10/4/24) with . Patient was transferred to PACU post op w/ 1 CT to suction w/ intermittent airleak. POD1, CT remained on suction w/ airleak and decreased output. POD2, CT remained to waterseal without airleak. POD3, CT remained on waterseal without airleak and CXR without PTX. Per , CT was removed. Post CT removal CXR w/out ***. Patient remained without complaints, urinating freely, and remained hemodynamically stable ambulating on RA. After discussion with , patient is cleared for discharge home on 10/7/24.    _ _ _ _ _ _ _ _ _ _ _ _   DISCHARGE PHYSICAL EXAM:   Appearance: No acute distress.  Neurologic: AAOx3, no AMS or focal deficits.  Responds appropriately to verbal and physical stimuli; exhibits purposeful movement in all extremities.  Cardiovascular: RRR, S1 S2. No m/r/g.  Respiratory: No acute respiratory distress. CTA b/l, no w/r/r.   Gastrointestinal:  Soft, non-tender, non-distended, + BS.	  Skin: No rashes. No ecchymoses. No cyanosis.  Extremities: Exhibits normal range of motion, no clubbing, cyanosis or edema.  Vascular: Peripheral pulses palpable 2+ bilaterally.  Incisions: +R VATS sites well approximated, R CT removal site w/ suture and dressing in place.   _ _ _ _ _ _ _ _   REMOVAL CHECKLIST:         [ ] Epicardial wires: no         [X] Stitches/tie downs,   If no, why? not ready to come out         [ ] PICC/Midline: no    _ _ _ _ _ _ _ _ _ _ _ _   MEDICATION DISCHARGE CHECKLIST   N/A  _ _ _ _ _ _ _ _ _ _ _   RELEVANT LABS/IMAGING:   _  _ _ _ _ _ _ _ _ _ _   CLINICAL FOLLOW UP NEEDS:      [ ] Lab work needed: no     [ ] Imaging needed: no     [ ] Home equipment: no   _ _ _ _ _ _ _ _ _ _ _ _   Over 35 minutes was spent with the patient reviewing the discharge material including medications, follow up appointments, recovery, concerning symptoms, and how to contact their health care providers if they have questions.   Patient discussed on morning rounds with Dr. Mayorga   Operation Date: S/p R VATS RLL lobectomy w/ MLND, lysis of adhesions (10/4/24)    Primary Surgeon/Attending MD:   Referring Physician: Dr.Dong Nolan Stover (PCP)  _ _ _ _ _ _ _ _ _ _ _ _   HOSPITAL COURSE:   62 yo F w/ PMHx of HLD, goiter (s/p thyroidectomy), who was referred by Dr.Dong Nolan Stover after incidental finding of lung nodule. Patient now s/p R VATS RA RLL lobectomy, MLND and lysis of adhesions (10/4/24) with . Patient was transferred to PACU post op w/ 1 CT to suction w/ intermittent airleak. POD1, CT remained on suction w/ airleak and decreased output. POD2, CT remained to waterseal without airleak. POD3, CT remained on waterseal without airleak and CXR without PTX. Per , CT was removed. Post CT removal CXR w/out PTX. Patient remained without complaints, urinating freely, and remained hemodynamically stable ambulating on RA. After discussion with , patient is cleared for discharge home on 10/7/24.    _ _ _ _ _ _ _ _ _ _ _ _   DISCHARGE PHYSICAL EXAM:   Appearance: No acute distress.  Neurologic: AAOx3, no AMS or focal deficits.  Responds appropriately to verbal and physical stimuli; exhibits purposeful movement in all extremities.  Cardiovascular: RRR, S1 S2. No m/r/g.  Respiratory: No acute respiratory distress. CTA b/l, no w/r/r.   Gastrointestinal:  Soft, non-tender, non-distended, + BS.	  Skin: No rashes. No ecchymoses. No cyanosis.  Extremities: Exhibits normal range of motion, no clubbing, cyanosis or edema.  Vascular: Peripheral pulses palpable 2+ bilaterally.  Incisions: +R VATS sites well approximated, R CT removal site w/ suture and dressing in place.   _ _ _ _ _ _ _ _   REMOVAL CHECKLIST:         [ ] Epicardial wires: no         [X] Stitches/tie downs,   If no, why? not ready to come out         [ ] PICC/Midline: no    _ _ _ _ _ _ _ _ _ _ _ _   MEDICATION DISCHARGE CHECKLIST   N/A  _ _ _ _ _ _ _ _ _ _ _   RELEVANT LABS/IMAGING:  N/A   _  _ _ _ _ _ _ _ _ _ _   CLINICAL FOLLOW UP NEEDS:      [ ] Lab work needed: no     [ ] Imaging needed: no     [ ] Home equipment: no   _ _ _ _ _ _ _ _ _ _ _ _   Over 35 minutes was spent with the patient reviewing the discharge material including medications, follow up appointments, recovery, concerning symptoms, and how to contact their health care providers if they have questions.

## 2024-10-07 NOTE — PROGRESS NOTE ADULT - REASON FOR ADMISSION
elective surgery: RVATS, RA, RLL lobectomy, MLND

## 2024-10-07 NOTE — PROGRESS NOTE ADULT - ASSESSMENT
Ms. MARI JUAREZ, 63 year old female,Mandarin/Fujianese speaking, never smoker, w/ hx of HLD, goiter s/p thyroidectomy on thyroid supplement, who presented with incidental finding of lung nodule, referred by Dr. Ryne Stover (PCP). CT Chest( 9/19/24): solid spiculated RLL lung nodule measuring 2.9 x 2.0 cm (with extension to the pleural surface) on highly suspicious for primary bronchogenic neoplasm. PET/CT (9/24/24): an FDG avid lobulated nodule in the peripheral RLL 2.6 cm SUV=4.9. MRI brain (9/19/24): unremarkable. Pt now underwent robot-assisted thoracoscopic lobectomy of lung using da Jose Xi Right lower lobectomy, mediastinal lymph node dissections. Lysis of adhesions (10/4/24)  POD#3    - 9LA for postop monitoring  - R CT x1 to be removed today    - CXR reviewed   - Incentive spirometry at bedside ( reinforced technique)   - hemodynamic: metoprolol tartrate 12.5 milliGRAM(s) Oral every 12 hours  - postop iv abx ppx: ceFAZolin   IVPB 2000 milliGRAM(s) IV Intermittent every 8 hours  - pain control:   lidocaine   4% Patch 1 Patch Transdermal daily  APAP 650mg po q6hr   - Oxcodone IR 5mg po q6hr prn moderate pain 4-6  - Oxycodone IR 10mg po q6hr prn severe pain 7-10   - Bowel regimen: polyethylene glycol 3350 17 Gram(s) Oral once                           senna 1 Tablet(s) Oral at bedtime  - s/p thyroidectomy: levothyroxine 75 MICROGram(s) Oral daily  - GI ppx: pantoprazole    Tablet 40 milliGRAM(s) Oral before breakfast  - DVT ppx: heparin   Injectable 5000 Unit(s) SubCutaneous every 8 hours    Contact:  PMD Dr. Ryne Stover   Nephrew: Dr.Anthony Wylie(GI)    Disposition: d/c home today, f/u CTS Dr. Ajay Brown for f/u and Path result. Spoke w. pt's yuri Wylie      Services to signoff since pt going home today.

## 2024-10-07 NOTE — DISCHARGE NOTE PROVIDER - NSDCCPTREATMENT_GEN_ALL_CORE_FT
PRINCIPAL PROCEDURE  Procedure: Robot-assisted thoracoscopic lobectomy of lung using da Jose Xi  Findings and Treatment: Right lower lobectomy, mediastinal lymph node dissections. Lysis of adhesions

## 2024-10-07 NOTE — DISCHARGE NOTE NURSING/CASE MANAGEMENT/SOCIAL WORK - NSDCFUADDAPPT_GEN_ALL_CORE_FT
-You will receive a phone call from our office informing you of your follow up appointments. If you do not hear from our office by 10/10/24, please call [phone].   - You may remove the dressing from your right chest on 10/9/24. The stitch will be removed in your follow up appointment with

## 2024-10-11 ENCOUNTER — APPOINTMENT (OUTPATIENT)
Dept: ULTRASOUND IMAGING | Facility: CLINIC | Age: 64
End: 2024-10-11

## 2024-10-11 ENCOUNTER — APPOINTMENT (OUTPATIENT)
Dept: MAMMOGRAPHY | Facility: CLINIC | Age: 64
End: 2024-10-11

## 2024-10-11 LAB — SURGICAL PATHOLOGY STUDY: SIGNIFICANT CHANGE UP

## 2024-10-14 PROBLEM — R91.1 SOLITARY PULMONARY NODULE: Chronic | Status: ACTIVE | Noted: 2024-10-03

## 2024-10-14 PROBLEM — E03.9 HYPOTHYROIDISM, UNSPECIFIED: Chronic | Status: ACTIVE | Noted: 2024-10-01

## 2024-10-14 PROBLEM — Z86.39 PERSONAL HISTORY OF OTHER ENDOCRINE, NUTRITIONAL AND METABOLIC DISEASE: Chronic | Status: ACTIVE | Noted: 2024-10-03

## 2024-10-14 PROBLEM — E78.5 HYPERLIPIDEMIA, UNSPECIFIED: Chronic | Status: ACTIVE | Noted: 2024-10-01

## 2024-10-15 ENCOUNTER — NON-APPOINTMENT (OUTPATIENT)
Age: 64
End: 2024-10-15

## 2024-10-18 PROBLEM — Z09 POSTOP CHECK: Status: ACTIVE | Noted: 2024-10-18

## 2024-10-23 PROBLEM — M31.30 NECROTIZING GRANULOMATOUS INFLAMMATION OF LUNG: Status: ACTIVE | Noted: 2024-10-23

## 2024-10-25 ENCOUNTER — OUTPATIENT (OUTPATIENT)
Dept: OUTPATIENT SERVICES | Facility: HOSPITAL | Age: 64
LOS: 1 days | End: 2024-10-25
Payer: MEDICARE

## 2024-10-25 ENCOUNTER — NON-APPOINTMENT (OUTPATIENT)
Age: 64
End: 2024-10-25

## 2024-10-25 ENCOUNTER — APPOINTMENT (OUTPATIENT)
Dept: THORACIC SURGERY | Facility: CLINIC | Age: 64
End: 2024-10-25
Payer: MEDICARE

## 2024-10-25 VITALS
HEART RATE: 64 BPM | OXYGEN SATURATION: 98 % | BODY MASS INDEX: 23.39 KG/M2 | TEMPERATURE: 97.6 F | WEIGHT: 137 LBS | DIASTOLIC BLOOD PRESSURE: 50 MMHG | SYSTOLIC BLOOD PRESSURE: 105 MMHG | HEIGHT: 64 IN

## 2024-10-25 DIAGNOSIS — Z09 ENCOUNTER FOR FOLLOW-UP EXAMINATION AFTER COMPLETED TREATMENT FOR CONDITIONS OTHER THAN MALIGNANT NEOPLASM: ICD-10-CM

## 2024-10-25 DIAGNOSIS — Z98.890 OTHER SPECIFIED POSTPROCEDURAL STATES: Chronic | ICD-10-CM

## 2024-10-25 DIAGNOSIS — E89.0 POSTPROCEDURAL HYPOTHYROIDISM: Chronic | ICD-10-CM

## 2024-10-25 DIAGNOSIS — M31.30 WEGENER'S GRANULOMATOSIS W/OUT RENAL INVOLVEMENT: ICD-10-CM

## 2024-10-25 PROCEDURE — 71046 X-RAY EXAM CHEST 2 VIEWS: CPT

## 2024-10-25 PROCEDURE — 71046 X-RAY EXAM CHEST 2 VIEWS: CPT | Mod: 26

## 2024-10-25 PROCEDURE — 99024 POSTOP FOLLOW-UP VISIT: CPT

## 2024-11-11 ENCOUNTER — TRANSCRIPTION ENCOUNTER (OUTPATIENT)
Age: 64
End: 2024-11-11

## 2024-11-11 ENCOUNTER — INPATIENT (INPATIENT)
Facility: HOSPITAL | Age: 64
LOS: 0 days | Discharge: HOME | DRG: 125 | End: 2024-11-11
Attending: OPHTHALMOLOGY | Admitting: OPHTHALMOLOGY
Payer: MEDICARE

## 2024-11-11 VITALS
RESPIRATION RATE: 16 BRPM | DIASTOLIC BLOOD PRESSURE: 60 MMHG | HEART RATE: 76 BPM | SYSTOLIC BLOOD PRESSURE: 117 MMHG | OXYGEN SATURATION: 96 %

## 2024-11-11 VITALS
WEIGHT: 134.92 LBS | OXYGEN SATURATION: 99 % | TEMPERATURE: 98 F | SYSTOLIC BLOOD PRESSURE: 124 MMHG | HEART RATE: 70 BPM | RESPIRATION RATE: 15 BRPM | HEIGHT: 64 IN | DIASTOLIC BLOOD PRESSURE: 74 MMHG

## 2024-11-11 DIAGNOSIS — E89.0 POSTPROCEDURAL HYPOTHYROIDISM: Chronic | ICD-10-CM

## 2024-11-11 DIAGNOSIS — Z98.890 OTHER SPECIFIED POSTPROCEDURAL STATES: Chronic | ICD-10-CM

## 2024-11-11 DIAGNOSIS — R91.1 SOLITARY PULMONARY NODULE: Chronic | ICD-10-CM

## 2024-11-11 DIAGNOSIS — H33.22 SEROUS RETINAL DETACHMENT, LEFT EYE: ICD-10-CM

## 2024-11-11 LAB
ALBUMIN SERPL ELPH-MCNC: 3.7 G/DL — SIGNIFICANT CHANGE UP (ref 3.3–5)
ALP SERPL-CCNC: 130 U/L — HIGH (ref 30–120)
ALT FLD-CCNC: 20 U/L — SIGNIFICANT CHANGE UP (ref 10–60)
ANION GAP SERPL CALC-SCNC: 8 MMOL/L — SIGNIFICANT CHANGE UP (ref 5–17)
APTT BLD: 34.5 SEC — SIGNIFICANT CHANGE UP (ref 24.5–35.6)
AST SERPL-CCNC: 20 U/L — SIGNIFICANT CHANGE UP (ref 10–40)
BASOPHILS # BLD AUTO: 0.05 K/UL — SIGNIFICANT CHANGE UP (ref 0–0.2)
BASOPHILS NFR BLD AUTO: 0.8 % — SIGNIFICANT CHANGE UP (ref 0–2)
BILIRUB SERPL-MCNC: 0.6 MG/DL — SIGNIFICANT CHANGE UP (ref 0.2–1.2)
BUN SERPL-MCNC: 15 MG/DL — SIGNIFICANT CHANGE UP (ref 7–23)
CALCIUM SERPL-MCNC: 9.9 MG/DL — SIGNIFICANT CHANGE UP (ref 8.4–10.5)
CHLORIDE SERPL-SCNC: 102 MMOL/L — SIGNIFICANT CHANGE UP (ref 96–108)
CO2 SERPL-SCNC: 32 MMOL/L — HIGH (ref 22–31)
CREAT SERPL-MCNC: 0.74 MG/DL — SIGNIFICANT CHANGE UP (ref 0.5–1.3)
EGFR: 89 ML/MIN/1.73M2 — SIGNIFICANT CHANGE UP
EOSINOPHIL # BLD AUTO: 0.08 K/UL — SIGNIFICANT CHANGE UP (ref 0–0.5)
EOSINOPHIL NFR BLD AUTO: 1.3 % — SIGNIFICANT CHANGE UP (ref 0–6)
GLUCOSE SERPL-MCNC: 103 MG/DL — HIGH (ref 70–99)
HCT VFR BLD CALC: 38.9 % — SIGNIFICANT CHANGE UP (ref 34.5–45)
HGB BLD-MCNC: 12.8 G/DL — SIGNIFICANT CHANGE UP (ref 11.5–15.5)
IMM GRANULOCYTES NFR BLD AUTO: 0.3 % — SIGNIFICANT CHANGE UP (ref 0–0.9)
INR BLD: 1.03 RATIO — SIGNIFICANT CHANGE UP (ref 0.85–1.16)
LYMPHOCYTES # BLD AUTO: 1.73 K/UL — SIGNIFICANT CHANGE UP (ref 1–3.3)
LYMPHOCYTES # BLD AUTO: 27.5 % — SIGNIFICANT CHANGE UP (ref 13–44)
MCHC RBC-ENTMCNC: 30.1 PG — SIGNIFICANT CHANGE UP (ref 27–34)
MCHC RBC-ENTMCNC: 32.9 G/DL — SIGNIFICANT CHANGE UP (ref 32–36)
MCV RBC AUTO: 91.5 FL — SIGNIFICANT CHANGE UP (ref 80–100)
MONOCYTES # BLD AUTO: 0.51 K/UL — SIGNIFICANT CHANGE UP (ref 0–0.9)
MONOCYTES NFR BLD AUTO: 8.1 % — SIGNIFICANT CHANGE UP (ref 2–14)
NEUTROPHILS # BLD AUTO: 3.89 K/UL — SIGNIFICANT CHANGE UP (ref 1.8–7.4)
NEUTROPHILS NFR BLD AUTO: 62 % — SIGNIFICANT CHANGE UP (ref 43–77)
NRBC # BLD: 0 /100 WBCS — SIGNIFICANT CHANGE UP (ref 0–0)
PLATELET # BLD AUTO: 222 K/UL — SIGNIFICANT CHANGE UP (ref 150–400)
POTASSIUM SERPL-MCNC: 4.3 MMOL/L — SIGNIFICANT CHANGE UP (ref 3.5–5.3)
POTASSIUM SERPL-SCNC: 4.3 MMOL/L — SIGNIFICANT CHANGE UP (ref 3.5–5.3)
PROT SERPL-MCNC: 7.6 G/DL — SIGNIFICANT CHANGE UP (ref 6–8.3)
PROTHROM AB SERPL-ACNC: 12.2 SEC — SIGNIFICANT CHANGE UP (ref 9.9–13.4)
RBC # BLD: 4.25 M/UL — SIGNIFICANT CHANGE UP (ref 3.8–5.2)
RBC # FLD: 11.8 % — SIGNIFICANT CHANGE UP (ref 10.3–14.5)
SODIUM SERPL-SCNC: 142 MMOL/L — SIGNIFICANT CHANGE UP (ref 135–145)
WBC # BLD: 6.28 K/UL — SIGNIFICANT CHANGE UP (ref 3.8–10.5)
WBC # FLD AUTO: 6.28 K/UL — SIGNIFICANT CHANGE UP (ref 3.8–10.5)

## 2024-11-11 PROCEDURE — 93010 ELECTROCARDIOGRAM REPORT: CPT

## 2024-11-11 PROCEDURE — 99221 1ST HOSP IP/OBS SF/LOW 40: CPT

## 2024-11-11 PROCEDURE — 71046 X-RAY EXAM CHEST 2 VIEWS: CPT | Mod: 26

## 2024-11-11 PROCEDURE — 99285 EMERGENCY DEPT VISIT HI MDM: CPT

## 2024-11-11 DEVICE — LASER PROBE 25G CONSTELLATION: Type: IMPLANTABLE DEVICE | Site: LEFT | Status: FUNCTIONAL

## 2024-11-11 DEVICE — GS C3F8 PERFLUOROPROPANE IOL 2.5 L 20GM: Type: IMPLANTABLE DEVICE | Site: LEFT | Status: FUNCTIONAL

## 2024-11-11 NOTE — ASU PATIENT PROFILE, ADULT - HEALTHCARE INFORMATION NEEDED, PROFILE
Patient speaks Michaele, requested daughter to interpret, (Kev  n/a on [employer], Michaele n/a as a consent option either). Daughter speaks fluent English & Kev, patient speaks and understands little English.

## 2024-11-11 NOTE — ED ADULT NURSE NOTE - DRUG PRE-SCREENING (DAST -1)
Chief Complaint   Patient presents with     Abdominal Pain     cramps since 03.02.2020, usually gets period at night but has not gotten it yet     Pregnancy Test     SUBJECTIVE:  Aracelis Osborne is a 19 year old female here today for a pregnancy test.  LMP: 2/8/20   Symptoms include: fatigue and some cramping. No nausea/vomiting. Period was due today. Usually regular periods. It feels like her period is coming but it isn't here yet- feels fatigue and mild cramping. Some vaginal moisture.      History reviewed. No pertinent past medical history.  No current outpatient medications on file.     Social History     Tobacco Use     Smoking status: Former Smoker     Smokeless tobacco: Never Used   Substance Use Topics     Alcohol use: Not Currently     No Known Allergies  ROS:  Review of systems negative except as stated above.    OBJECTIVE:   /58 (BP Location: Right arm, Patient Position: Sitting, Cuff Size: Adult Regular)   Pulse 80   Temp 99.1  F (37.3  C) (Oral)   LMP 02/13/2020 (Exact Date)   SpO2 100%   Breastfeeding No   GENERAL APPEARANCE: healthy, alert and in no distress  RESP: lungs clear to auscultation - no rales, rhonchi or wheezes  CV: regular rates and rhythm, normal S1 S2, no murmur noted    Pregnancy test is negative    ASSESSMENT:    ICD-10-CM    1. Encounter for pregnancy test Z32.00 HCG Qual, Urine ([id number])     PLAN:     She is advised to notify a provider immediately if she experiences any severe cramping or abdominal pain or any vaginal bleeding.  Follow up with primary care provider with any problems, questions or concerns.  She can return here in 2 days for a repeat pregnancy test if still concerned about pregnancy as it is early for the pregnancy to be picked up by a urine test.   Patient agreed to plan and verbalized understanding.    Lo Muro, FNP-BC, CNP  
Statement Selected

## 2024-11-11 NOTE — ASU DISCHARGE PLAN (ADULT/PEDIATRIC) - PROVIDER TOKENS
FREE:[LAST:[Michael],FIRST:[Jeremy],PHONE:[phone]],FAX:[(   )    -],ADDRESS:[[address] [place] [address], [address] [address]],SCHEDULEDAPPT:[11/12/2024],SCHEDULEDAPPTTIME:[09:30 AM]]

## 2024-11-11 NOTE — ED PROVIDER NOTE - OBJECTIVE STATEMENT
64-year-old female with a history of hypothyroidism status post thyroidectomy, right lobectomy with VATS presents with having some left eye visual changes over the past 4 to 5 days.  The patient was seen by ophthalmology, and sent for retinal detachment today.  No acute pain to the eye.  No acute headache.  No recent trauma.  No neck or back pain.  No cough/URI.  No aggravating or alleviating factors otherwise noted.  No other acute complaints.

## 2024-11-11 NOTE — ASU DISCHARGE PLAN (ADULT/PEDIATRIC) - ASU DC SPECIAL INSTRUCTIONSFT
Please maintain FACE DOWN position as much as possible with short breaks for bathroom and meals.  Please keep eye shield on until seen in the office.   You may resume your medication regimen as usual.   Please follow up with your retina surgeon's office for your appointment tomorrow.

## 2024-11-11 NOTE — ASU DISCHARGE PLAN (ADULT/PEDIATRIC) - FINANCIAL ASSISTANCE
[facility] provides services at a reduced cost to those who are determined to be eligible through [facility]’s financial assistance program. Information regarding [facility]’s financial assistance program can be found by going to [email or web address] or by calling [phone].

## 2024-11-11 NOTE — CONSULT NOTE ADULT - CONSULT REASON
Office Note - Neurosurgery   Nydia Ortiz 59 y o  male MRN: [id number]      Assessment:    Patient is stable  68-year-old gentleman with right lumbar radiculopathy which may be related to disc herniation at L1-2 and L2-3 or possibly foraminal stenosis at L5-S1  There is some displacement of the conus with some cord signal change however he has no objective findings of myelopathy or radiculopathy  We did discuss an L1-2 and L2-3 right-sided decompression with instrumented fixation and fusion  The risks, benefits alternatives were reviewed  Expected postop course and medications were reviewed  However, he would prefer to exhausted nonsurgical pain management strategies  I will refer him to pain management to discuss possible epidural steroid injection, including a right transforaminal L5-S1 injection  I asked him to discuss a course of membrane stabilizing agents with his PCP  I reviewed the signs and symptoms of progressive lumbar radiculopathy and myelopathy and asked him to contact this office should any concerns arise  Otherwise he will follow up in 6 months time for ongoing clinical surveillance  History, physical examination and diagnostic tests were reviewed and questions answered  Diagnosis, care plan and treatment options were discussed  The patient understand instructions and will follow up as directed  Plan:    Follow-up:  6 months    Problem List Items Addressed This Visit        Nervous and Auditory    Chronic right-sided low back pain with sciatica - Primary    Relevant Orders    Ambulatory Referral to Pain Management      Other Visit Diagnoses     Lower back pain        Relevant Orders    Ambulatory Referral to Pain Management    Ambulatory Referral to Pain Management          Subjective/Objective     Chief Complaint    Intermittent right leg pain  HPI    68-year-old gentleman with 3 year history of relapsing remitting course of right buttock pain and lower back pain    There is no particular inciting event  In the distant past he had undergone epidural steroid injections for lower back pain which was quite helpful  Over the past 2-3 months the pain has become more frequent and severe  Describes lower back pain which can radiate into the right buttock which can be a cramping sensation  It rarely radiates into the leg  Describes some numbness and tingling in his toes on the left foot but denies any pain, weakness or numbness in his left leg  He denies any weakness or numbness in his right leg  Denies any difficulties with bowel bladder function or changing perineal sensation  Medrol Dosepak has been helpful with his pain  He is not tried physical therapy recently or epidural steroid injections recently though these have been helpful in the past   He is not tried any membrane stabilizing agents  He presents today with an MRI of the lumbar spine  NATALIYA AN personally reviewed and updated  Review of Systems   Constitutional: Negative  HENT: Negative  Eyes: Negative  Respiratory: Negative  Cardiovascular: Negative  Gastrointestinal: Negative  Endocrine: Negative  Genitourinary: Positive for frequency and urgency (with some incontinence)  Musculoskeletal: Positive for back pain (off and on, some days severe; b/l lbp R>L radiating into buttock and down leg at times) and gait problem (right leg seems weaker, limping)  No recent PT, does HEP from past PT  No recent PM, no recent injections   Skin: Negative  Allergic/Immunologic: Negative  Neurological: Positive for weakness (right leg) and numbness (b/l left toes, mild)  Hematological: Negative  Psychiatric/Behavioral: Positive for sleep disturbance (pain sometimes keeps him up, when pain is bad)  All other systems reviewed and are negative  Family History    History reviewed  No pertinent family history      Social History    Social History     Socioeconomic History    Marital status: /Civil Union     Spouse name: Not on file    Number of children: Not on file    Years of education: Not on file    Highest education level: Not on file   Occupational History    Not on file   Tobacco Use    Smoking status: Never Smoker    Smokeless tobacco: Never Used   Substance and Sexual Activity    Alcohol use: Yes    Drug use: No    Sexual activity: Not on file   Other Topics Concern    Not on file   Social History Narrative    Not on file     Social Determinants of Health     Financial Resource Strain: Not on file   Food Insecurity: Not on file   Transportation Needs: Not on file   Physical Activity: Not on file   Stress: Not on file   Social Connections: Not on file   Intimate Partner Violence: Not on file   Housing Stability: Not on file       Past Medical History    History reviewed  No pertinent past medical history  Surgical History    History reviewed  No pertinent surgical history  Medications      Current Outpatient Medications:     meloxicam (Mobic) 15 mg tablet, Take 1 tablet (15 mg total) by mouth daily, Disp: 30 tablet, Rfl: 5    rosuvastatin (CRESTOR) 10 MG tablet, Take 1 tablet (10 mg total) by mouth daily, Disp: 90 tablet, Rfl: 3    Allergies    No Known Allergies    The following portions of the patient's history were reviewed and updated as appropriate: allergies, current medications, past family history, past medical history, past social history, past surgical history and problem list     Investigations    I personally reviewed the MRI results with the patient:    MRI of the lumbar spine without contrast dated September 4th, 2022  Straightening of lumbar lordosis  Multiple levels of degenerative disc disease and facet arthropathy  Large right paracentral disc herniation at L1-2 which extends into the foramen and behind the L1 vertebral body  This displaces the conus to the left and there appears to be some increased signal within the conus    This produces Perioperative Management for Retinal Detachment severe right lateral recess stenosis  At L2-3 there is a right paracentral disc herniation which produces lateral recess stenosis  No significant stenosis at L3-4  At L4-5 there is left lateral recess and foraminal stenosis secondary to degenerative changes with only mild stenosis on the right  At L5-S1 there is moderate to severe right foraminal stenosis secondary to degenerative changes  Physical Exam    Vitals:  Blood pressure 142/84, pulse 71, temperature 98 3 °F (36 8 °C), temperature source Temporal, height 5' 11" (1 803 m), weight 90 7 kg (200 lb), SpO2 97 %  ,Body mass index is 27 89 kg/m²  Physical Exam  Vitals reviewed  Constitutional:       General: He is not in acute distress  Eyes:      Extraocular Movements: Extraocular movements intact  Pulmonary:      Effort: Pulmonary effort is normal  No respiratory distress  Musculoskeletal:         General: No deformity  Skin:     General: Skin is warm and dry  Neurological:      Mental Status: He is alert and oriented to person, place, and time  Comments: 5/5 power in lower extremities  Reports normal light touch sensation lower extremities  No clonus  Deep tendon reflexes 2+ at both patella and at the right Achilles  Could not be elicited at the left Achilles  Straight leg raise negative  Walks with a steady gait  Able to heel-toe gait  Romberg negative  Able to stand from a seated position with arms folded without difficulty  Psychiatric:         Mood and Affect: Mood normal          Behavior: Behavior normal        Neurologic Exam     Mental Status   Oriented to person, place, and time

## 2024-11-11 NOTE — ED PROVIDER NOTE - EYES, MLM
Clear bilaterally, pupils equal, round and reactive to light. dec acuity L eye: no erythema / edema. EOMI

## 2024-11-11 NOTE — CONSULT NOTE ADULT - SUBJECTIVE AND OBJECTIVE BOX
64F with Hypothyroidism, and Right Lung Lobectomy comes to ED today after being diagnosed with Retinal Detachment. Patient noticed increase in floaters and decrease vision over the past few days.  Went to see ophtholmalogist and was diagnosed with retinal detachement.  Patient asked to come to ED for urgent repair.  Patient reports no chest pain, SOB, Palpitaitons or leg swelling. No symptoms of active cardiac disease being reported.  Routine labs and imaging performed with no acute abnormalities. Patient currently resting in bed comfortably. I am being asked to evaluate patient for francisca-operative management.       REVIEW OF SYSTEMS:  CONSTITUTIONAL: No fever, weight loss, or fatigue  EYES: No eye pain, visual disturbances, or discharge  ENMT:  No difficulty hearing, tinnitus, vertigo; No sinus or throat pain  NECK: No pain or stiffness  RESPIRATORY: No cough, wheezing, chills or hemoptysis; No shortness of breath  CARDIOVASCULAR: No chest pain, palpitations, dizziness, or leg swelling  GASTROINTESTINAL: No abdominal or epigastric pain. No nausea, vomiting, or hematemesis; No diarrhea or constipation. No melena or hematochezia.  GENITOURINARY: No dysuria, frequency, hematuria, or incontinence  NEUROLOGICAL: No headaches, memory loss, loss of strength, numbness, or tremors  MUSCULOSKELETAL: No muscle or back pain      PAST MEDICAL & SURGICAL HISTORY:  Hyperlipidemia      Hypothyroidism      H/O goiter      Lung nodule      H/O thyroidectomy      History of open reduction and internal fixation (ORIF) procedure  right humerus          SOCIAL HISTORY:  Tobacco; EtOH; Illicit Drugs    Allergies    No Known Allergies    Intolerances        MEDICATIONS  (STANDING):    MEDICATIONS  (PRN):      FAMILY HISTORY:  No pertinent family history in first degree relatives        Vital Signs Last 24 Hrs  T(C): 36.4 (11 Nov 2024 07:17), Max: 36.4 (11 Nov 2024 07:17)  T(F): 97.5 (11 Nov 2024 07:17), Max: 97.5 (11 Nov 2024 07:17)  HR: 70 (11 Nov 2024 07:17) (70 - 70)  BP: 124/74 (11 Nov 2024 07:17) (124/74 - 124/74)  BP(mean): --  RR: 15 (11 Nov 2024 07:17) (15 - 15)  SpO2: 99% (11 Nov 2024 07:17) (99% - 99%)    Parameters below as of 11 Nov 2024 07:17  Patient On (Oxygen Delivery Method): room air        PHYSICAL EXAM:    GENERAL: NAD, well-developed  HEAD:  Atraumatic, Normocephalic  EYES: EOMI, PERRLA, conjunctiva and sclera clear  ENMT: No tonsillar erythema, exudates, or enlargement; Moist mucous membranes, Good dentition, No lesions  NECK: Supple, No JVD, Normal thyroid  NERVOUS SYSTEM:  Alert & Oriented X3, Good concentration;  CHEST/LUNG: Clear to auscultation bilaterally; No rales, rhonchi, wheezing, or rubs  HEART: Regular rate and rhythm; No murmurs, rubs, or gallops  ABDOMEN: Soft, Nontender, Nondistended; Bowel sounds present  EXTREMITIES:  2+ Peripheral Pulses, No clubbing, cyanosis, or edema      LABS:                        12.8   6.28  )-----------( 222      ( 11 Nov 2024 08:01 )             38.9     11-11    142  |  102  |  15  ----------------------------<  103[H]  4.3   |  32[H]  |  0.74    Ca    9.9      11 Nov 2024 08:01    TPro  7.6  /  Alb  3.7  /  TBili  0.6  /  DBili  x   /  AST  20  /  ALT  20  /  AlkPhos  130[H]  11-11    PT/INR - ( 11 Nov 2024 08:01 )   PT: 12.2 sec;   INR: 1.03 ratio         PTT - ( 11 Nov 2024 08:01 )  PTT:34.5 sec  Urinalysis Basic - ( 11 Nov 2024 08:01 )    Color: x / Appearance: x / SG: x / pH: x  Gluc: 103 mg/dL / Ketone: x  / Bili: x / Urobili: x   Blood: x / Protein: x / Nitrite: x   Leuk Esterase: x / RBC: x / WBC x   Sq Epi: x / Non Sq Epi: x / Bacteria: x      CAPILLARY BLOOD GLUCOSE          RADIOLOGY & ADDITIONAL STUDIES:    EKG:   Personally Reviewed:  [ ] YES     Imaging:   Personally Reviewed:  [ ] YES     Consultant(s) Notes Reviewed:      Care Discussed with Consultants/Other Providers:

## 2024-11-11 NOTE — ED PROVIDER NOTE - NSICDXPASTSURGICALHX_GEN_ALL_CORE_FT
PAST SURGICAL HISTORY:  H/O thyroidectomy     History of open reduction and internal fixation (ORIF) procedure right humerus

## 2024-11-11 NOTE — ASU PATIENT PROFILE, ADULT - LANGUAGE ASSISTANCE NEEDED
Kev  not available through [employer], daughter Tala speaks English & Kev, patient also understands some English and requested her daughter to interpret for her today/No-Patient/Caregiver offered and refused free interpretation services.

## 2024-11-11 NOTE — ED ADULT NURSE NOTE - OBJECTIVE STATEMENT
patient has c/o left eye retinal detachment since Friday with blurred vision, Dr. Rodriguez is surgeon.

## 2024-11-11 NOTE — ASU DISCHARGE PLAN (ADULT/PEDIATRIC) - NS MD DC FALL RISK RISK
For information on Fall & Injury Prevention, visit: [email or web address] OR  [email or web address] OR  https://www.cdc.gov/steadi/patient.html

## 2024-11-11 NOTE — ASU PATIENT PROFILE, ADULT - NSICDXPASTSURGICALHX_GEN_ALL_CORE_FT
PAST SURGICAL HISTORY:  H/O thyroidectomy     History of open reduction and internal fixation (ORIF) procedure right humerus    Nodule of lower lobe of right lung benign

## 2024-11-11 NOTE — CONSULT NOTE ADULT - ASSESSMENT
64F with Hypothyroidism, and Right Lung Lobectomy here for repair of retinal detachment Patient with no active symptoms of cardiac disease. Labs reviewed. 12 Lead ECG with no ST-T Changes and patient is medically optimized for procedure with no further testing required.

## 2024-11-11 NOTE — ASU PATIENT PROFILE, ADULT - FALL HARM RISK - HARM RISK INTERVENTIONS
Assistance with ambulation/Communicate Risk of Fall with Harm to all staff/Monitor for mental status changes/Monitor gait and stability/Reinforce activity limits and safety measures with patient and family/Tailored Fall Risk Interventions/Visual Cue: Yellow wristband and red socks/Bed in lowest position, wheels locked, appropriate side rails in place/Call bell, personal items and telephone in reach/Instruct patient to call for assistance before getting out of bed or chair/Non-slip footwear when patient is out of bed/[place] to call system/Physically safe environment - no spills, clutter or unnecessary equipment/Purposeful Proactive Rounding/Room/bathroom lighting operational, light cord in reach

## 2024-11-11 NOTE — ASU DISCHARGE PLAN (ADULT/PEDIATRIC) - CARE PROVIDER_API CALL
Jeremy Rodriguez  [address] [place] [address], [address]  [place] [address]  Phone: [phone]  Fax: (   )    -  Scheduled Appointment: 11/12/2024 09:30 AM

## 2024-11-26 PROCEDURE — 36415 COLL VENOUS BLD VENIPUNCTURE: CPT

## 2024-11-26 PROCEDURE — 83735 ASSAY OF MAGNESIUM: CPT

## 2024-11-26 PROCEDURE — 85610 PROTHROMBIN TIME: CPT

## 2024-11-26 PROCEDURE — 88305 TISSUE EXAM BY PATHOLOGIST: CPT

## 2024-11-26 PROCEDURE — 85025 COMPLETE CBC W/AUTO DIFF WBC: CPT

## 2024-11-26 PROCEDURE — C9399: CPT

## 2024-11-26 PROCEDURE — 99285 EMERGENCY DEPT VISIT HI MDM: CPT | Mod: 25

## 2024-11-26 PROCEDURE — 84100 ASSAY OF PHOSPHORUS: CPT

## 2024-11-26 PROCEDURE — 71045 X-RAY EXAM CHEST 1 VIEW: CPT

## 2024-11-26 PROCEDURE — 93005 ELECTROCARDIOGRAM TRACING: CPT

## 2024-11-26 PROCEDURE — 83036 HEMOGLOBIN GLYCOSYLATED A1C: CPT

## 2024-11-26 PROCEDURE — S2900: CPT

## 2024-11-26 PROCEDURE — 82962 GLUCOSE BLOOD TEST: CPT

## 2024-11-26 PROCEDURE — 86850 RBC ANTIBODY SCREEN: CPT

## 2024-11-26 PROCEDURE — C1889: CPT

## 2024-11-26 PROCEDURE — 80048 BASIC METABOLIC PNL TOTAL CA: CPT

## 2024-11-26 PROCEDURE — 85027 COMPLETE CBC AUTOMATED: CPT

## 2024-11-26 PROCEDURE — 86900 BLOOD TYPING SEROLOGIC ABO: CPT

## 2024-11-26 PROCEDURE — 71046 X-RAY EXAM CHEST 2 VIEWS: CPT

## 2024-11-26 PROCEDURE — 85730 THROMBOPLASTIN TIME PARTIAL: CPT

## 2024-11-26 PROCEDURE — 88309 TISSUE EXAM BY PATHOLOGIST: CPT

## 2024-11-26 PROCEDURE — 86901 BLOOD TYPING SEROLOGIC RH(D): CPT

## 2024-11-26 PROCEDURE — 84443 ASSAY THYROID STIM HORMONE: CPT

## 2024-11-26 PROCEDURE — 80053 COMPREHEN METABOLIC PANEL: CPT

## 2024-11-26 PROCEDURE — 86923 COMPATIBILITY TEST ELECTRIC: CPT

## 2025-07-12 ENCOUNTER — OUTPATIENT (OUTPATIENT)
Dept: OUTPATIENT SERVICES | Facility: HOSPITAL | Age: 65
LOS: 1 days | End: 2025-07-12

## 2025-07-12 ENCOUNTER — APPOINTMENT (OUTPATIENT)
Dept: CT IMAGING | Facility: CLINIC | Age: 65
End: 2025-07-12

## 2025-07-12 DIAGNOSIS — E89.0 POSTPROCEDURAL HYPOTHYROIDISM: Chronic | ICD-10-CM

## 2025-07-12 DIAGNOSIS — Z98.890 OTHER SPECIFIED POSTPROCEDURAL STATES: Chronic | ICD-10-CM

## 2025-07-12 DIAGNOSIS — R91.1 SOLITARY PULMONARY NODULE: Chronic | ICD-10-CM

## 2025-07-12 PROCEDURE — 74177 CT ABD & PELVIS W/CONTRAST: CPT | Mod: 26

## 2025-07-23 ENCOUNTER — OUTPATIENT (OUTPATIENT)
Dept: OUTPATIENT SERVICES | Facility: HOSPITAL | Age: 65
LOS: 1 days | End: 2025-07-23
Payer: MEDICARE

## 2025-07-23 ENCOUNTER — APPOINTMENT (OUTPATIENT)
Dept: CT IMAGING | Facility: HOSPITAL | Age: 65
End: 2025-07-23

## 2025-07-23 DIAGNOSIS — Z98.890 OTHER SPECIFIED POSTPROCEDURAL STATES: Chronic | ICD-10-CM

## 2025-07-23 DIAGNOSIS — R91.1 SOLITARY PULMONARY NODULE: Chronic | ICD-10-CM

## 2025-07-23 DIAGNOSIS — E89.0 POSTPROCEDURAL HYPOTHYROIDISM: Chronic | ICD-10-CM

## 2025-07-23 PROCEDURE — 71250 CT THORAX DX C-: CPT

## 2025-07-23 PROCEDURE — 71250 CT THORAX DX C-: CPT | Mod: 26

## 2025-09-05 ENCOUNTER — OUTPATIENT (OUTPATIENT)
Dept: OUTPATIENT SERVICES | Facility: HOSPITAL | Age: 65
LOS: 1 days | End: 2025-09-05

## 2025-09-05 ENCOUNTER — APPOINTMENT (OUTPATIENT)
Dept: MAMMOGRAPHY | Facility: CLINIC | Age: 65
End: 2025-09-05

## 2025-09-05 ENCOUNTER — APPOINTMENT (OUTPATIENT)
Dept: ULTRASOUND IMAGING | Facility: CLINIC | Age: 65
End: 2025-09-05

## 2025-09-05 DIAGNOSIS — Z98.890 OTHER SPECIFIED POSTPROCEDURAL STATES: Chronic | ICD-10-CM

## 2025-09-05 DIAGNOSIS — E89.0 POSTPROCEDURAL HYPOTHYROIDISM: Chronic | ICD-10-CM

## 2025-09-05 DIAGNOSIS — R91.1 SOLITARY PULMONARY NODULE: Chronic | ICD-10-CM

## 2025-09-05 PROCEDURE — 76641 ULTRASOUND BREAST COMPLETE: CPT | Mod: 26,50

## 2025-09-05 PROCEDURE — 77067 SCR MAMMO BI INCL CAD: CPT | Mod: 26

## 2025-09-05 PROCEDURE — 77063 BREAST TOMOSYNTHESIS BI: CPT | Mod: 26

## (undated) DEVICE — GOWN XL

## (undated) DEVICE — XI OBTURATOR OPTICAL BLADELESS 8MM

## (undated) DEVICE — XI DRAPE COLUMN

## (undated) DEVICE — FLEXLOOP CURVED SCRAPER MEMBRANE 25G

## (undated) DEVICE — TROCAR ETHICON ENDOPATH XCEL BLADELESS 12MM X 100MM STABILITY

## (undated) DEVICE — XI ARM FORCEP FENESTRATED BIPOLAR 8MM

## (undated) DEVICE — VENODYNE/SCD SLEEVE CALF MEDIUM

## (undated) DEVICE — AUTO GAS FILL CONSTELLATION

## (undated) DEVICE — SUT PDO 0 1/2 CIRCLE 26MM NDL 20CM

## (undated) DEVICE — SPONGE ENDO PEANUT 5MM

## (undated) DEVICE — ENDOCATCH GENERAL 15MM (PURPLE)

## (undated) DEVICE — TAPE SILK 3"

## (undated) DEVICE — WARMING BLANKET LOWER ADULT

## (undated) DEVICE — TROCAR ETHICON ENDOPATH XCEL BLADELESS 12MM X 100MM SMOOTH

## (undated) DEVICE — STAPLER COVIDIEN ENDO GIA STANDARD HANDLE

## (undated) DEVICE — SUT SILK 2-0 30" SH

## (undated) DEVICE — PACK ROBOTIC

## (undated) DEVICE — XI SEAL UNIVERSIAL 5-12MM

## (undated) DEVICE — SUT VLOC 180 0 12" GS-21 GREEN

## (undated) DEVICE — ELCTR BOVIE TIP BLADE VALLEYLAB 6.5"

## (undated) DEVICE — TROCAR ETHICON ENDOPATH XCEL BLADELESS 5MM X 100MM STABILITY

## (undated) DEVICE — PACK CONSTELLATION POST 25G 20K

## (undated) DEVICE — GLV 7.5 PROTEXIS (WHITE)

## (undated) DEVICE — INSUFFLATION NDL COVIDIEN SURGINEEDLE VERESS 120MM

## (undated) DEVICE — GOWN XL W TOWEL

## (undated) DEVICE — SUT PROLENE 0 30" CT-1

## (undated) DEVICE — SUT PDO 0 1/2 CIRCLE 22MM NDL 30CM

## (undated) DEVICE — GLV 8 PROTEXIS (WHITE)

## (undated) DEVICE — Device

## (undated) DEVICE — DVC ASCOPE 4 SNGL USE SLIM

## (undated) DEVICE — ENDOCATCH GENERAL 10MM (PURPLE)

## (undated) DEVICE — ELCTR BOVIE TIP SPATULA MEGADYNE E-Z CLEAN LAPAROSCOPIC 13.5" STANDARD

## (undated) DEVICE — TROCAR ETHICON ENDOPATH XCEL BLADELESS 15MM X 100MM STABILITY

## (undated) DEVICE — DRSG GAUZE PACKTNER ROLL

## (undated) DEVICE — NDL HYPO REGULAR BEVEL 25G X 1.5" (BLUE)

## (undated) DEVICE — ELCTR BOVIE TIP BLADE INSULATED 6.5" EDGE

## (undated) DEVICE — SUT SILK 0 30" SH

## (undated) DEVICE — ILM FORCEP 23G

## (undated) DEVICE — TUBING AIRSEAL TRI-LUMEN FILTERED

## (undated) DEVICE — SUT QUILL PLY PRO 0 1/2 CIRCLE 22MM 30CM CLEAR

## (undated) DEVICE — CANNULA ALCON SOFT TIP 25G

## (undated) DEVICE — XI VESSEL SEALER

## (undated) DEVICE — SUT VICRYL 7-0 12" TG140-8 DA

## (undated) DEVICE — XI DRAPE ARM

## (undated) DEVICE — SOL IRR SALT BSS PLUS 500ML

## (undated) DEVICE — SUT PDO 0 1/2 CIRCLE 26MM NDL 30CM

## (undated) DEVICE — DIATHERMY PROBE 25GA

## (undated) DEVICE — DRAPE OPHTHALMIC W POUCH

## (undated) DEVICE — D HELP - CLEARVIEW CLEARIFY SYSTEM

## (undated) DEVICE — SYE-LASER - CONSTELLATION MACHINE #2 1003466901X: Type: DURABLE MEDICAL EQUIPMENT

## (undated) DEVICE — DRAPE MICROSCOPE RESIGHT

## (undated) DEVICE — SOL BALANCE SALT 15ML

## (undated) DEVICE — LENS VITRECTOMY FLAT

## (undated) DEVICE — PACK VITRECTOMY

## (undated) DEVICE — SCRAPER MEMBRANE 23G

## (undated) DEVICE — ILM FORCEP 25G

## (undated) DEVICE — CHEST DRAIN PLEUR-EVAC DRY/WET ADULT-PEDS SINGLE (QUICK)

## (undated) DEVICE — ELCTR BIPOLAR CORD J&J 12FT DISP

## (undated) DEVICE — CANNULA ALCON SOFT TIP 23G

## (undated) DEVICE — ELCTR GROUNDING PAD ADULT COVIDIEN